# Patient Record
Sex: FEMALE | Race: WHITE | HISPANIC OR LATINO | URBAN - METROPOLITAN AREA
[De-identification: names, ages, dates, MRNs, and addresses within clinical notes are randomized per-mention and may not be internally consistent; named-entity substitution may affect disease eponyms.]

---

## 2017-03-06 ENCOUNTER — OUTPATIENT (OUTPATIENT)
Dept: OUTPATIENT SERVICES | Facility: HOSPITAL | Age: 32
LOS: 1 days | End: 2017-03-06
Payer: COMMERCIAL

## 2017-03-06 ENCOUNTER — RESULT REVIEW (OUTPATIENT)
Age: 32
End: 2017-03-06

## 2017-03-06 PROCEDURE — 76536 US EXAM OF HEAD AND NECK: CPT | Mod: 26

## 2017-03-06 PROCEDURE — 76536 US EXAM OF HEAD AND NECK: CPT

## 2017-03-06 PROCEDURE — 76817 TRANSVAGINAL US OBSTETRIC: CPT

## 2017-03-06 PROCEDURE — 76817 TRANSVAGINAL US OBSTETRIC: CPT | Mod: 26

## 2017-03-07 ENCOUNTER — OUTPATIENT (OUTPATIENT)
Dept: OUTPATIENT SERVICES | Facility: HOSPITAL | Age: 32
LOS: 1 days | End: 2017-03-07
Payer: COMMERCIAL

## 2017-03-07 DIAGNOSIS — Z13.79 ENCOUNTER FOR OTHER SCREENING FOR GENETIC AND CHROMOSOMAL ANOMALIES: ICD-10-CM

## 2017-03-07 PROCEDURE — 88305 TISSUE EXAM BY PATHOLOGIST: CPT

## 2017-03-08 LAB — SURGICAL PATHOLOGY STUDY: SIGNIFICANT CHANGE UP

## 2017-03-15 DIAGNOSIS — E04.1 NONTOXIC SINGLE THYROID NODULE: ICD-10-CM

## 2017-03-15 DIAGNOSIS — O34.11 MATERNAL CARE FOR BENIGN TUMOR OF CORPUS UTERI, FIRST TRIMESTER: ICD-10-CM

## 2017-03-15 DIAGNOSIS — Z3A.01 LESS THAN 8 WEEKS GESTATION OF PREGNANCY: ICD-10-CM

## 2017-03-20 LAB — CHROM ANALY OVERALL INTERP SPEC-IMP: SIGNIFICANT CHANGE UP

## 2017-07-31 ENCOUNTER — OUTPATIENT (OUTPATIENT)
Dept: OUTPATIENT SERVICES | Facility: HOSPITAL | Age: 32
LOS: 1 days | End: 2017-07-31
Payer: COMMERCIAL

## 2017-07-31 DIAGNOSIS — N39.0 URINARY TRACT INFECTION, SITE NOT SPECIFIED: ICD-10-CM

## 2017-07-31 PROCEDURE — 81001 URINALYSIS AUTO W/SCOPE: CPT

## 2017-07-31 PROCEDURE — 87086 URINE CULTURE/COLONY COUNT: CPT

## 2017-08-30 ENCOUNTER — OTHER (OUTPATIENT)
Age: 32
End: 2017-08-30

## 2017-09-06 ENCOUNTER — RX RENEWAL (OUTPATIENT)
Age: 32
End: 2017-09-06

## 2017-09-22 ENCOUNTER — OUTPATIENT (OUTPATIENT)
Dept: OUTPATIENT SERVICES | Facility: HOSPITAL | Age: 32
LOS: 1 days | End: 2017-09-22
Payer: COMMERCIAL

## 2017-09-22 PROCEDURE — 76830 TRANSVAGINAL US NON-OB: CPT

## 2017-09-22 PROCEDURE — A9585: CPT

## 2017-09-22 PROCEDURE — 76856 US EXAM PELVIC COMPLETE: CPT

## 2017-09-22 PROCEDURE — 76700 US EXAM ABDOM COMPLETE: CPT

## 2017-09-22 PROCEDURE — 76830 TRANSVAGINAL US NON-OB: CPT | Mod: 26

## 2017-09-22 PROCEDURE — 76856 US EXAM PELVIC COMPLETE: CPT | Mod: 26

## 2017-09-22 PROCEDURE — 76700 US EXAM ABDOM COMPLETE: CPT | Mod: 26

## 2017-09-22 PROCEDURE — 70553 MRI BRAIN STEM W/O & W/DYE: CPT

## 2017-09-22 PROCEDURE — 70553 MRI BRAIN STEM W/O & W/DYE: CPT | Mod: 26

## 2017-10-02 ENCOUNTER — APPOINTMENT (OUTPATIENT)
Dept: HUMAN REPRODUCTION | Facility: CLINIC | Age: 32
End: 2017-10-02
Payer: COMMERCIAL

## 2017-10-02 PROCEDURE — 36415 COLL VENOUS BLD VENIPUNCTURE: CPT

## 2017-10-02 PROCEDURE — 76830 TRANSVAGINAL US NON-OB: CPT

## 2017-10-02 PROCEDURE — 99214 OFFICE O/P EST MOD 30 MIN: CPT | Mod: 25

## 2017-11-01 ENCOUNTER — APPOINTMENT (OUTPATIENT)
Dept: HUMAN REPRODUCTION | Facility: CLINIC | Age: 32
End: 2017-11-01
Payer: COMMERCIAL

## 2017-11-01 PROCEDURE — 99215 OFFICE O/P EST HI 40 MIN: CPT | Mod: 25

## 2017-11-16 ENCOUNTER — APPOINTMENT (OUTPATIENT)
Dept: HUMAN REPRODUCTION | Facility: CLINIC | Age: 32
End: 2017-11-16
Payer: COMMERCIAL

## 2017-11-16 PROCEDURE — 36415 COLL VENOUS BLD VENIPUNCTURE: CPT

## 2017-11-16 PROCEDURE — 99212 OFFICE O/P EST SF 10 MIN: CPT | Mod: 25

## 2017-12-06 ENCOUNTER — CHART COPY (OUTPATIENT)
Age: 32
End: 2017-12-06

## 2017-12-27 ENCOUNTER — OUTPATIENT (OUTPATIENT)
Dept: OUTPATIENT SERVICES | Facility: HOSPITAL | Age: 32
LOS: 1 days | End: 2017-12-27
Payer: COMMERCIAL

## 2017-12-27 PROCEDURE — 76817 TRANSVAGINAL US OBSTETRIC: CPT

## 2017-12-27 PROCEDURE — 76817 TRANSVAGINAL US OBSTETRIC: CPT | Mod: 26

## 2018-01-08 ENCOUNTER — OUTPATIENT (OUTPATIENT)
Dept: OUTPATIENT SERVICES | Facility: HOSPITAL | Age: 33
LOS: 1 days | End: 2018-01-08
Payer: COMMERCIAL

## 2018-01-08 PROCEDURE — 76801 OB US < 14 WKS SINGLE FETUS: CPT

## 2018-01-08 PROCEDURE — 76801 OB US < 14 WKS SINGLE FETUS: CPT | Mod: 26

## 2018-02-07 ENCOUNTER — CHART COPY (OUTPATIENT)
Age: 33
End: 2018-02-07

## 2018-04-11 ENCOUNTER — OUTPATIENT (OUTPATIENT)
Dept: OUTPATIENT SERVICES | Facility: HOSPITAL | Age: 33
LOS: 1 days | End: 2018-04-11
Payer: COMMERCIAL

## 2018-04-11 DIAGNOSIS — O26.899 OTHER SPECIFIED PREGNANCY RELATED CONDITIONS, UNSPECIFIED TRIMESTER: ICD-10-CM

## 2018-04-11 DIAGNOSIS — Z3A.00 WEEKS OF GESTATION OF PREGNANCY NOT SPECIFIED: ICD-10-CM

## 2018-04-11 LAB
APPEARANCE UR: CLEAR — SIGNIFICANT CHANGE UP
BILIRUB UR-MCNC: NEGATIVE — SIGNIFICANT CHANGE UP
COLOR SPEC: YELLOW — SIGNIFICANT CHANGE UP
DIFF PNL FLD: NEGATIVE — SIGNIFICANT CHANGE UP
GLUCOSE UR QL: NEGATIVE — SIGNIFICANT CHANGE UP
KETONES UR-MCNC: NEGATIVE — SIGNIFICANT CHANGE UP
LEUKOCYTE ESTERASE UR-ACNC: NEGATIVE — SIGNIFICANT CHANGE UP
NITRITE UR-MCNC: NEGATIVE — SIGNIFICANT CHANGE UP
PH UR: 7 — SIGNIFICANT CHANGE UP (ref 5–8)
PROT UR-MCNC: NEGATIVE MG/DL — SIGNIFICANT CHANGE UP
SP GR SPEC: 1.01 — SIGNIFICANT CHANGE UP (ref 1–1.03)
UROBILINOGEN FLD QL: 0.2 E.U./DL — SIGNIFICANT CHANGE UP

## 2018-04-11 PROCEDURE — 81003 URINALYSIS AUTO W/O SCOPE: CPT

## 2018-04-11 PROCEDURE — 87086 URINE CULTURE/COLONY COUNT: CPT

## 2018-04-11 PROCEDURE — 76815 OB US LIMITED FETUS(S): CPT

## 2018-04-11 PROCEDURE — 99214 OFFICE O/P EST MOD 30 MIN: CPT

## 2018-04-12 LAB
CULTURE RESULTS: NO GROWTH — SIGNIFICANT CHANGE UP
SPECIMEN SOURCE: SIGNIFICANT CHANGE UP

## 2018-05-31 ENCOUNTER — RESULT REVIEW (OUTPATIENT)
Age: 33
End: 2018-05-31

## 2018-06-01 LAB
AMPHET UR-MCNC: NEGATIVE
BARBITURATES UR-MCNC: NEGATIVE
BENZODIAZ UR-MCNC: NEGATIVE
COCAINE METAB.OTHER UR-MCNC: NEGATIVE
CREATININE, URINE: 28.7 MG/DL
METHADONE UR-MCNC: NEGATIVE
METHAQUALONE UR-MCNC: NEGATIVE
OPIATES UR-MCNC: NEGATIVE
PCP UR-MCNC: NEGATIVE
PROPOXYPH UR QL: NEGATIVE
THC UR QL: NEGATIVE

## 2018-07-31 ENCOUNTER — OUTPATIENT (OUTPATIENT)
Dept: OUTPATIENT SERVICES | Facility: HOSPITAL | Age: 33
LOS: 1 days | End: 2018-07-31
Payer: COMMERCIAL

## 2018-07-31 DIAGNOSIS — O26.899 OTHER SPECIFIED PREGNANCY RELATED CONDITIONS, UNSPECIFIED TRIMESTER: ICD-10-CM

## 2018-07-31 DIAGNOSIS — Z3A.00 WEEKS OF GESTATION OF PREGNANCY NOT SPECIFIED: ICD-10-CM

## 2018-07-31 LAB
ALBUMIN SERPL ELPH-MCNC: 3.8 G/DL — SIGNIFICANT CHANGE UP (ref 3.3–5)
ALP SERPL-CCNC: 151 U/L — HIGH (ref 40–120)
ALT FLD-CCNC: 10 U/L — SIGNIFICANT CHANGE UP (ref 10–45)
ANION GAP SERPL CALC-SCNC: 14 MMOL/L — SIGNIFICANT CHANGE UP (ref 5–17)
APPEARANCE UR: CLEAR — SIGNIFICANT CHANGE UP
APTT BLD: 25.2 SEC — LOW (ref 27.5–37.4)
AST SERPL-CCNC: 17 U/L — SIGNIFICANT CHANGE UP (ref 10–40)
BASOPHILS NFR BLD AUTO: 0.1 % — SIGNIFICANT CHANGE UP (ref 0–2)
BILIRUB SERPL-MCNC: <0.2 MG/DL — SIGNIFICANT CHANGE UP (ref 0.2–1.2)
BILIRUB UR-MCNC: NEGATIVE — SIGNIFICANT CHANGE UP
BUN SERPL-MCNC: 10 MG/DL — SIGNIFICANT CHANGE UP (ref 7–23)
CALCIUM SERPL-MCNC: 10.3 MG/DL — SIGNIFICANT CHANGE UP (ref 8.4–10.5)
CHLORIDE SERPL-SCNC: 103 MMOL/L — SIGNIFICANT CHANGE UP (ref 96–108)
CO2 SERPL-SCNC: 20 MMOL/L — LOW (ref 22–31)
COLOR SPEC: YELLOW — SIGNIFICANT CHANGE UP
CREAT ?TM UR-MCNC: 84 MG/DL — SIGNIFICANT CHANGE UP
CREAT SERPL-MCNC: 0.69 MG/DL — SIGNIFICANT CHANGE UP (ref 0.5–1.3)
DIFF PNL FLD: NEGATIVE — SIGNIFICANT CHANGE UP
EOSINOPHIL NFR BLD AUTO: 1.2 % — SIGNIFICANT CHANGE UP (ref 0–6)
FIBRINOGEN PPP-MCNC: 573 MG/DL — HIGH (ref 258–438)
GLUCOSE SERPL-MCNC: 88 MG/DL — SIGNIFICANT CHANGE UP (ref 70–99)
GLUCOSE UR QL: NEGATIVE — SIGNIFICANT CHANGE UP
HCT VFR BLD CALC: 33.6 % — LOW (ref 34.5–45)
HGB BLD-MCNC: 11 G/DL — LOW (ref 11.5–15.5)
INR BLD: 0.93 — SIGNIFICANT CHANGE UP (ref 0.88–1.16)
KETONES UR-MCNC: NEGATIVE — SIGNIFICANT CHANGE UP
LDH SERPL L TO P-CCNC: 167 U/L — SIGNIFICANT CHANGE UP (ref 50–242)
LEUKOCYTE ESTERASE UR-ACNC: NEGATIVE — SIGNIFICANT CHANGE UP
LYMPHOCYTES # BLD AUTO: 35.7 % — SIGNIFICANT CHANGE UP (ref 13–44)
MCHC RBC-ENTMCNC: 28.1 PG — SIGNIFICANT CHANGE UP (ref 27–34)
MCHC RBC-ENTMCNC: 32.7 G/DL — SIGNIFICANT CHANGE UP (ref 32–36)
MCV RBC AUTO: 85.9 FL — SIGNIFICANT CHANGE UP (ref 80–100)
MONOCYTES NFR BLD AUTO: 7.6 % — SIGNIFICANT CHANGE UP (ref 2–14)
NEUTROPHILS NFR BLD AUTO: 55.4 % — SIGNIFICANT CHANGE UP (ref 43–77)
NITRITE UR-MCNC: NEGATIVE — SIGNIFICANT CHANGE UP
PH UR: 7 — SIGNIFICANT CHANGE UP (ref 5–8)
PLATELET # BLD AUTO: 160 K/UL — SIGNIFICANT CHANGE UP (ref 150–400)
POTASSIUM SERPL-MCNC: 4.4 MMOL/L — SIGNIFICANT CHANGE UP (ref 3.5–5.3)
POTASSIUM SERPL-SCNC: 4.4 MMOL/L — SIGNIFICANT CHANGE UP (ref 3.5–5.3)
PROT ?TM UR-MCNC: 10 MG/DL — SIGNIFICANT CHANGE UP (ref 0–12)
PROT SERPL-MCNC: 6.8 G/DL — SIGNIFICANT CHANGE UP (ref 6–8.3)
PROT UR-MCNC: NEGATIVE MG/DL — SIGNIFICANT CHANGE UP
PROT/CREAT UR-RTO: 0.1 RATIO — SIGNIFICANT CHANGE UP (ref 0–0.2)
PROTHROM AB SERPL-ACNC: 10.3 SEC — SIGNIFICANT CHANGE UP (ref 9.8–12.7)
RBC # BLD: 3.91 M/UL — SIGNIFICANT CHANGE UP (ref 3.8–5.2)
RBC # FLD: 13.3 % — SIGNIFICANT CHANGE UP (ref 10.3–16.9)
SODIUM SERPL-SCNC: 137 MMOL/L — SIGNIFICANT CHANGE UP (ref 135–145)
SP GR SPEC: 1.01 — SIGNIFICANT CHANGE UP (ref 1–1.03)
URATE SERPL-MCNC: 4.2 MG/DL — SIGNIFICANT CHANGE UP (ref 2.5–7)
UROBILINOGEN FLD QL: 0.2 E.U./DL — SIGNIFICANT CHANGE UP
WBC # BLD: 8.3 K/UL — SIGNIFICANT CHANGE UP (ref 3.8–10.5)
WBC # FLD AUTO: 8.3 K/UL — SIGNIFICANT CHANGE UP (ref 3.8–10.5)

## 2018-07-31 PROCEDURE — 85384 FIBRINOGEN ACTIVITY: CPT

## 2018-07-31 PROCEDURE — 85025 COMPLETE CBC W/AUTO DIFF WBC: CPT

## 2018-07-31 PROCEDURE — 76818 FETAL BIOPHYS PROFILE W/NST: CPT

## 2018-07-31 PROCEDURE — 59025 FETAL NON-STRESS TEST: CPT

## 2018-07-31 PROCEDURE — 36415 COLL VENOUS BLD VENIPUNCTURE: CPT

## 2018-07-31 PROCEDURE — 85610 PROTHROMBIN TIME: CPT

## 2018-07-31 PROCEDURE — 84550 ASSAY OF BLOOD/URIC ACID: CPT

## 2018-07-31 PROCEDURE — 99214 OFFICE O/P EST MOD 30 MIN: CPT

## 2018-07-31 PROCEDURE — 84156 ASSAY OF PROTEIN URINE: CPT

## 2018-07-31 PROCEDURE — 85730 THROMBOPLASTIN TIME PARTIAL: CPT

## 2018-07-31 PROCEDURE — 83615 LACTATE (LD) (LDH) ENZYME: CPT

## 2018-07-31 PROCEDURE — 80053 COMPREHEN METABOLIC PANEL: CPT

## 2018-07-31 PROCEDURE — 81003 URINALYSIS AUTO W/O SCOPE: CPT

## 2018-08-02 DIAGNOSIS — O09.623 SUPERVISION OF YOUNG MULTIGRAVIDA, THIRD TRIMESTER: ICD-10-CM

## 2018-08-10 ENCOUNTER — OUTPATIENT (OUTPATIENT)
Dept: OUTPATIENT SERVICES | Facility: HOSPITAL | Age: 33
LOS: 1 days | End: 2018-08-10
Payer: COMMERCIAL

## 2018-08-10 ENCOUNTER — INPATIENT (INPATIENT)
Facility: HOSPITAL | Age: 33
LOS: 2 days | Discharge: HOME | End: 2018-08-13
Attending: OBSTETRICS & GYNECOLOGY | Admitting: OBSTETRICS & GYNECOLOGY
Payer: COMMERCIAL

## 2018-08-10 VITALS — WEIGHT: 194.01 LBS | HEIGHT: 67 IN

## 2018-08-10 DIAGNOSIS — O26.899 OTHER SPECIFIED PREGNANCY RELATED CONDITIONS, UNSPECIFIED TRIMESTER: ICD-10-CM

## 2018-08-10 DIAGNOSIS — Z3A.00 WEEKS OF GESTATION OF PREGNANCY NOT SPECIFIED: ICD-10-CM

## 2018-08-10 LAB
ALBUMIN SERPL ELPH-MCNC: 3.7 G/DL — SIGNIFICANT CHANGE UP (ref 3.3–5)
ALP SERPL-CCNC: 155 U/L — HIGH (ref 40–120)
ALT FLD-CCNC: 11 U/L — SIGNIFICANT CHANGE UP (ref 10–45)
ANION GAP SERPL CALC-SCNC: 16 MMOL/L — SIGNIFICANT CHANGE UP (ref 5–17)
APPEARANCE UR: CLEAR — SIGNIFICANT CHANGE UP
APTT BLD: 24.8 SEC — LOW (ref 27.5–37.4)
AST SERPL-CCNC: 17 U/L — SIGNIFICANT CHANGE UP (ref 10–40)
BASOPHILS NFR BLD AUTO: 0 % — SIGNIFICANT CHANGE UP (ref 0–2)
BILIRUB SERPL-MCNC: 0.2 MG/DL — SIGNIFICANT CHANGE UP (ref 0.2–1.2)
BILIRUB UR-MCNC: NEGATIVE — SIGNIFICANT CHANGE UP
BLD GP AB SCN SERPL QL: NEGATIVE — SIGNIFICANT CHANGE UP
BUN SERPL-MCNC: 7 MG/DL — SIGNIFICANT CHANGE UP (ref 7–23)
CALCIUM SERPL-MCNC: 9.5 MG/DL — SIGNIFICANT CHANGE UP (ref 8.4–10.5)
CHLORIDE SERPL-SCNC: 102 MMOL/L — SIGNIFICANT CHANGE UP (ref 96–108)
CO2 SERPL-SCNC: 20 MMOL/L — LOW (ref 22–31)
COLOR SPEC: YELLOW — SIGNIFICANT CHANGE UP
CREAT ?TM UR-MCNC: 70 MG/DL — SIGNIFICANT CHANGE UP
CREAT SERPL-MCNC: 0.65 MG/DL — SIGNIFICANT CHANGE UP (ref 0.5–1.3)
DIFF PNL FLD: NEGATIVE — SIGNIFICANT CHANGE UP
EOSINOPHIL NFR BLD AUTO: 0.9 % — SIGNIFICANT CHANGE UP (ref 0–6)
FIBRINOGEN PPP-MCNC: 500 MG/DL — HIGH (ref 258–438)
GLUCOSE SERPL-MCNC: 107 MG/DL — HIGH (ref 70–99)
GLUCOSE UR QL: NEGATIVE — SIGNIFICANT CHANGE UP
HCT VFR BLD CALC: 34.8 % — SIGNIFICANT CHANGE UP (ref 34.5–45)
HGB BLD-MCNC: 11.3 G/DL — LOW (ref 11.5–15.5)
INR BLD: 0.94 — SIGNIFICANT CHANGE UP (ref 0.88–1.16)
KETONES UR-MCNC: NEGATIVE — SIGNIFICANT CHANGE UP
LDH SERPL L TO P-CCNC: 162 U/L — SIGNIFICANT CHANGE UP (ref 50–242)
LEUKOCYTE ESTERASE UR-ACNC: NEGATIVE — SIGNIFICANT CHANGE UP
LYMPHOCYTES # BLD AUTO: 28 % — SIGNIFICANT CHANGE UP (ref 13–44)
MCHC RBC-ENTMCNC: 28.4 PG — SIGNIFICANT CHANGE UP (ref 27–34)
MCHC RBC-ENTMCNC: 32.5 G/DL — SIGNIFICANT CHANGE UP (ref 32–36)
MCV RBC AUTO: 87.4 FL — SIGNIFICANT CHANGE UP (ref 80–100)
MONOCYTES NFR BLD AUTO: 3.9 % — SIGNIFICANT CHANGE UP (ref 2–14)
NEUTROPHILS NFR BLD AUTO: 67.2 % — SIGNIFICANT CHANGE UP (ref 43–77)
NITRITE UR-MCNC: NEGATIVE — SIGNIFICANT CHANGE UP
PH UR: 6.5 — SIGNIFICANT CHANGE UP (ref 5–8)
PLATELET # BLD AUTO: 199 K/UL — SIGNIFICANT CHANGE UP (ref 150–400)
POTASSIUM SERPL-MCNC: 3.9 MMOL/L — SIGNIFICANT CHANGE UP (ref 3.5–5.3)
POTASSIUM SERPL-SCNC: 3.9 MMOL/L — SIGNIFICANT CHANGE UP (ref 3.5–5.3)
PROT ?TM UR-MCNC: 10 MG/DL — SIGNIFICANT CHANGE UP (ref 0–12)
PROT SERPL-MCNC: 6.6 G/DL — SIGNIFICANT CHANGE UP (ref 6–8.3)
PROT UR-MCNC: NEGATIVE MG/DL — SIGNIFICANT CHANGE UP
PROT/CREAT UR-RTO: 0.1 RATIO — SIGNIFICANT CHANGE UP (ref 0–0.2)
PROTHROM AB SERPL-ACNC: 10.4 SEC — SIGNIFICANT CHANGE UP (ref 9.8–12.7)
RBC # BLD: 3.98 M/UL — SIGNIFICANT CHANGE UP (ref 3.8–5.2)
RBC # FLD: 13.6 % — SIGNIFICANT CHANGE UP (ref 10.3–16.9)
RH IG SCN BLD-IMP: POSITIVE — SIGNIFICANT CHANGE UP
SODIUM SERPL-SCNC: 138 MMOL/L — SIGNIFICANT CHANGE UP (ref 135–145)
SP GR SPEC: 1.01 — SIGNIFICANT CHANGE UP (ref 1–1.03)
URATE SERPL-MCNC: 4.9 MG/DL — SIGNIFICANT CHANGE UP (ref 2.5–7)
UROBILINOGEN FLD QL: 0.2 E.U./DL — SIGNIFICANT CHANGE UP
WBC # BLD: 7.6 K/UL — SIGNIFICANT CHANGE UP (ref 3.8–10.5)
WBC # FLD AUTO: 7.6 K/UL — SIGNIFICANT CHANGE UP (ref 3.8–10.5)

## 2018-08-10 PROCEDURE — 99214 OFFICE O/P EST MOD 30 MIN: CPT

## 2018-08-10 PROCEDURE — 85384 FIBRINOGEN ACTIVITY: CPT

## 2018-08-10 PROCEDURE — 85610 PROTHROMBIN TIME: CPT

## 2018-08-10 PROCEDURE — 84550 ASSAY OF BLOOD/URIC ACID: CPT

## 2018-08-10 PROCEDURE — 80053 COMPREHEN METABOLIC PANEL: CPT

## 2018-08-10 PROCEDURE — 86850 RBC ANTIBODY SCREEN: CPT

## 2018-08-10 PROCEDURE — 85730 THROMBOPLASTIN TIME PARTIAL: CPT

## 2018-08-10 PROCEDURE — 81003 URINALYSIS AUTO W/O SCOPE: CPT

## 2018-08-10 PROCEDURE — 85025 COMPLETE CBC W/AUTO DIFF WBC: CPT

## 2018-08-10 PROCEDURE — 76818 FETAL BIOPHYS PROFILE W/NST: CPT

## 2018-08-10 PROCEDURE — 83615 LACTATE (LD) (LDH) ENZYME: CPT

## 2018-08-10 PROCEDURE — 86901 BLOOD TYPING SEROLOGIC RH(D): CPT

## 2018-08-10 PROCEDURE — 86900 BLOOD TYPING SEROLOGIC ABO: CPT

## 2018-08-10 PROCEDURE — 84156 ASSAY OF PROTEIN URINE: CPT

## 2018-08-11 ENCOUNTER — RESULT REVIEW (OUTPATIENT)
Age: 33
End: 2018-08-11

## 2018-08-11 LAB
BASOPHILS NFR BLD AUTO: 0.1 % — SIGNIFICANT CHANGE UP (ref 0–2)
BLD GP AB SCN SERPL QL: NEGATIVE — SIGNIFICANT CHANGE UP
EOSINOPHIL NFR BLD AUTO: 1.2 % — SIGNIFICANT CHANGE UP (ref 0–6)
HCT VFR BLD CALC: 34.7 % — SIGNIFICANT CHANGE UP (ref 34.5–45)
HGB BLD-MCNC: 11.6 G/DL — SIGNIFICANT CHANGE UP (ref 11.5–15.5)
LYMPHOCYTES # BLD AUTO: 33.5 % — SIGNIFICANT CHANGE UP (ref 13–44)
MCHC RBC-ENTMCNC: 28.7 PG — SIGNIFICANT CHANGE UP (ref 27–34)
MCHC RBC-ENTMCNC: 33.4 G/DL — SIGNIFICANT CHANGE UP (ref 32–36)
MCV RBC AUTO: 85.9 FL — SIGNIFICANT CHANGE UP (ref 80–100)
MONOCYTES NFR BLD AUTO: 7.6 % — SIGNIFICANT CHANGE UP (ref 2–14)
NEUTROPHILS NFR BLD AUTO: 57.6 % — SIGNIFICANT CHANGE UP (ref 43–77)
PLATELET # BLD AUTO: 176 K/UL — SIGNIFICANT CHANGE UP (ref 150–400)
RBC # BLD: 4.04 M/UL — SIGNIFICANT CHANGE UP (ref 3.8–5.2)
RBC # FLD: 13.2 % — SIGNIFICANT CHANGE UP (ref 10.3–16.9)
RH IG SCN BLD-IMP: POSITIVE — SIGNIFICANT CHANGE UP
WBC # BLD: 10.6 K/UL — HIGH (ref 3.8–10.5)
WBC # FLD AUTO: 10.6 K/UL — HIGH (ref 3.8–10.5)

## 2018-08-11 RX ORDER — SIMETHICONE 80 MG/1
80 TABLET, CHEWABLE ORAL EVERY 6 HOURS
Qty: 0 | Refills: 0 | Status: DISCONTINUED | OUTPATIENT
Start: 2018-08-11 | End: 2018-08-13

## 2018-08-11 RX ORDER — HYDROCORTISONE 1 %
1 OINTMENT (GRAM) TOPICAL EVERY 4 HOURS
Qty: 0 | Refills: 0 | Status: DISCONTINUED | OUTPATIENT
Start: 2018-08-11 | End: 2018-08-13

## 2018-08-11 RX ORDER — TETANUS TOXOID, REDUCED DIPHTHERIA TOXOID AND ACELLULAR PERTUSSIS VACCINE, ADSORBED 5; 2.5; 8; 8; 2.5 [IU]/.5ML; [IU]/.5ML; UG/.5ML; UG/.5ML; UG/.5ML
0.5 SUSPENSION INTRAMUSCULAR ONCE
Qty: 0 | Refills: 0 | Status: COMPLETED | OUTPATIENT
Start: 2018-08-11 | End: 2019-07-10

## 2018-08-11 RX ORDER — ACETAMINOPHEN 500 MG
650 TABLET ORAL EVERY 6 HOURS
Qty: 0 | Refills: 0 | Status: DISCONTINUED | OUTPATIENT
Start: 2018-08-11 | End: 2018-08-13

## 2018-08-11 RX ORDER — SODIUM CHLORIDE 9 MG/ML
1000 INJECTION, SOLUTION INTRAVENOUS ONCE
Qty: 0 | Refills: 0 | Status: DISCONTINUED | OUTPATIENT
Start: 2018-08-11 | End: 2018-08-11

## 2018-08-11 RX ORDER — DIBUCAINE 1 %
1 OINTMENT (GRAM) RECTAL EVERY 4 HOURS
Qty: 0 | Refills: 0 | Status: DISCONTINUED | OUTPATIENT
Start: 2018-08-11 | End: 2018-08-13

## 2018-08-11 RX ORDER — OXYCODONE AND ACETAMINOPHEN 5; 325 MG/1; MG/1
1 TABLET ORAL
Qty: 0 | Refills: 0 | Status: DISCONTINUED | OUTPATIENT
Start: 2018-08-11 | End: 2018-08-13

## 2018-08-11 RX ORDER — HEPARIN SODIUM 5000 [USP'U]/ML
5000 INJECTION INTRAVENOUS; SUBCUTANEOUS EVERY 12 HOURS
Qty: 0 | Refills: 0 | Status: DISCONTINUED | OUTPATIENT
Start: 2018-08-11 | End: 2018-08-13

## 2018-08-11 RX ORDER — IBUPROFEN 200 MG
600 TABLET ORAL EVERY 6 HOURS
Qty: 0 | Refills: 0 | Status: DISCONTINUED | OUTPATIENT
Start: 2018-08-11 | End: 2018-08-13

## 2018-08-11 RX ORDER — OXYCODONE AND ACETAMINOPHEN 5; 325 MG/1; MG/1
2 TABLET ORAL EVERY 6 HOURS
Qty: 0 | Refills: 0 | Status: DISCONTINUED | OUTPATIENT
Start: 2018-08-11 | End: 2018-08-13

## 2018-08-11 RX ORDER — OXYTOCIN 10 UNIT/ML
41.67 VIAL (ML) INJECTION
Qty: 20 | Refills: 0 | Status: DISCONTINUED | OUTPATIENT
Start: 2018-08-11 | End: 2018-08-13

## 2018-08-11 RX ORDER — SODIUM CHLORIDE 9 MG/ML
3 INJECTION INTRAMUSCULAR; INTRAVENOUS; SUBCUTANEOUS EVERY 8 HOURS
Qty: 0 | Refills: 0 | Status: DISCONTINUED | OUTPATIENT
Start: 2018-08-11 | End: 2018-08-13

## 2018-08-11 RX ORDER — MAGNESIUM HYDROXIDE 400 MG/1
30 TABLET, CHEWABLE ORAL
Qty: 0 | Refills: 0 | Status: DISCONTINUED | OUTPATIENT
Start: 2018-08-11 | End: 2018-08-13

## 2018-08-11 RX ORDER — CITRIC ACID/SODIUM CITRATE 300-500 MG
15 SOLUTION, ORAL ORAL EVERY 4 HOURS
Qty: 0 | Refills: 0 | Status: DISCONTINUED | OUTPATIENT
Start: 2018-08-11 | End: 2018-08-11

## 2018-08-11 RX ORDER — DIPHENHYDRAMINE HCL 50 MG
25 CAPSULE ORAL EVERY 6 HOURS
Qty: 0 | Refills: 0 | Status: DISCONTINUED | OUTPATIENT
Start: 2018-08-11 | End: 2018-08-13

## 2018-08-11 RX ORDER — AER TRAVELER 0.5 G/1
1 SOLUTION RECTAL; TOPICAL EVERY 4 HOURS
Qty: 0 | Refills: 0 | Status: DISCONTINUED | OUTPATIENT
Start: 2018-08-11 | End: 2018-08-13

## 2018-08-11 RX ORDER — GLYCERIN ADULT
1 SUPPOSITORY, RECTAL RECTAL AT BEDTIME
Qty: 0 | Refills: 0 | Status: DISCONTINUED | OUTPATIENT
Start: 2018-08-11 | End: 2018-08-13

## 2018-08-11 RX ORDER — DOCUSATE SODIUM 100 MG
100 CAPSULE ORAL
Qty: 0 | Refills: 0 | Status: DISCONTINUED | OUTPATIENT
Start: 2018-08-11 | End: 2018-08-13

## 2018-08-11 RX ORDER — PRAMOXINE HYDROCHLORIDE 150 MG/15G
1 AEROSOL, FOAM RECTAL EVERY 4 HOURS
Qty: 0 | Refills: 0 | Status: DISCONTINUED | OUTPATIENT
Start: 2018-08-11 | End: 2018-08-13

## 2018-08-11 RX ORDER — OXYTOCIN 10 UNIT/ML
333.33 VIAL (ML) INJECTION
Qty: 20 | Refills: 0 | Status: DISCONTINUED | OUTPATIENT
Start: 2018-08-11 | End: 2018-08-11

## 2018-08-11 RX ORDER — LANOLIN
1 OINTMENT (GRAM) TOPICAL EVERY 6 HOURS
Qty: 0 | Refills: 0 | Status: DISCONTINUED | OUTPATIENT
Start: 2018-08-11 | End: 2018-08-13

## 2018-08-11 RX ORDER — OXYTOCIN 10 UNIT/ML
1 VIAL (ML) INJECTION
Qty: 30 | Refills: 0 | Status: DISCONTINUED | OUTPATIENT
Start: 2018-08-11 | End: 2018-08-12

## 2018-08-11 RX ORDER — SODIUM CHLORIDE 9 MG/ML
1000 INJECTION, SOLUTION INTRAVENOUS
Qty: 0 | Refills: 0 | Status: DISCONTINUED | OUTPATIENT
Start: 2018-08-11 | End: 2018-08-11

## 2018-08-11 RX ADMIN — HEPARIN SODIUM 5000 UNIT(S): 5000 INJECTION INTRAVENOUS; SUBCUTANEOUS at 18:09

## 2018-08-11 RX ADMIN — AER TRAVELER 1 APPLICATION(S): 0.5 SOLUTION RECTAL; TOPICAL at 14:00

## 2018-08-11 RX ADMIN — Medication 600 MILLIGRAM(S): at 19:00

## 2018-08-11 RX ADMIN — Medication 100 MILLIGRAM(S): at 12:26

## 2018-08-11 RX ADMIN — AER TRAVELER 1 APPLICATION(S): 0.5 SOLUTION RECTAL; TOPICAL at 22:15

## 2018-08-11 RX ADMIN — Medication 600 MILLIGRAM(S): at 18:12

## 2018-08-11 RX ADMIN — SODIUM CHLORIDE 3 MILLILITER(S): 9 INJECTION INTRAMUSCULAR; INTRAVENOUS; SUBCUTANEOUS at 14:00

## 2018-08-11 RX ADMIN — AER TRAVELER 1 APPLICATION(S): 0.5 SOLUTION RECTAL; TOPICAL at 18:15

## 2018-08-11 RX ADMIN — Medication 1 TABLET(S): at 12:26

## 2018-08-11 RX ADMIN — SODIUM CHLORIDE 3 MILLILITER(S): 9 INJECTION INTRAMUSCULAR; INTRAVENOUS; SUBCUTANEOUS at 22:15

## 2018-08-11 RX ADMIN — AER TRAVELER 1 APPLICATION(S): 0.5 SOLUTION RECTAL; TOPICAL at 10:10

## 2018-08-11 RX ADMIN — Medication 600 MILLIGRAM(S): at 13:00

## 2018-08-11 RX ADMIN — Medication 600 MILLIGRAM(S): at 12:26

## 2018-08-11 NOTE — LACTATION INITIAL EVALUATION - ORAL/MOTOR ACTIVITY
Possible TOTs as baby was noted to take a suck cycle and pop off the breast needing to be relatched several times. At other times, baby was able to sustain the latch. Some dimpling of cheeks and accessory muscles noted at times when baby sucked.

## 2018-08-11 NOTE — LACTATION INITIAL EVALUATION - NS LACT CON REASON FOR REQ
Met Dyad and FOB. RNLizbeth, helped pt latch baby onto the R breast in a football hold and reports baby was latched deeply. Upon entering room, baby had spontaneously released the R breast with open hand body posture. Discussed bf'ing basics, position/latch, expected input/output. Pt stated "I am so tired. Can we swaddle him and put him in his crib?" Attempted to swaddle baby and he immediately woke up and began giving active feeding cues again. Assisted pt to latch baby onto the L breast in a football hold. Baby fed for several suck cycles, then released and fell asleep again. Baby appears to be cluster feeding; BS RN aware of consult. Encouraged as much SSC and rooming in with baby as possible aiming to FOD of baby when giving active feeding cues. Baby born @0716, 39 wks,  now, . x2 voids/x2 poops since birth (breast only), WNL. No wt loss recorded at this time. Baby is 15 hours old. BS RN aware of consult. Questions answered, parents reassured./primaparous mom

## 2018-08-12 ENCOUNTER — TRANSCRIPTION ENCOUNTER (OUTPATIENT)
Age: 33
End: 2018-08-12

## 2018-08-12 LAB — T PALLIDUM AB TITR SER: NEGATIVE — SIGNIFICANT CHANGE UP

## 2018-08-12 RX ADMIN — AER TRAVELER 1 APPLICATION(S): 0.5 SOLUTION RECTAL; TOPICAL at 02:00

## 2018-08-12 RX ADMIN — AER TRAVELER 1 APPLICATION(S): 0.5 SOLUTION RECTAL; TOPICAL at 17:18

## 2018-08-12 RX ADMIN — HEPARIN SODIUM 5000 UNIT(S): 5000 INJECTION INTRAVENOUS; SUBCUTANEOUS at 18:11

## 2018-08-12 RX ADMIN — SODIUM CHLORIDE 3 MILLILITER(S): 9 INJECTION INTRAMUSCULAR; INTRAVENOUS; SUBCUTANEOUS at 07:29

## 2018-08-12 RX ADMIN — Medication 600 MILLIGRAM(S): at 23:30

## 2018-08-12 RX ADMIN — OXYCODONE AND ACETAMINOPHEN 1 TABLET(S): 5; 325 TABLET ORAL at 22:00

## 2018-08-12 RX ADMIN — Medication 1 TABLET(S): at 12:29

## 2018-08-12 RX ADMIN — Medication 600 MILLIGRAM(S): at 18:10

## 2018-08-12 RX ADMIN — OXYCODONE AND ACETAMINOPHEN 1 TABLET(S): 5; 325 TABLET ORAL at 23:00

## 2018-08-12 RX ADMIN — HEPARIN SODIUM 5000 UNIT(S): 5000 INJECTION INTRAVENOUS; SUBCUTANEOUS at 06:28

## 2018-08-12 RX ADMIN — Medication 600 MILLIGRAM(S): at 12:30

## 2018-08-12 RX ADMIN — Medication 600 MILLIGRAM(S): at 01:00

## 2018-08-12 RX ADMIN — AER TRAVELER 1 APPLICATION(S): 0.5 SOLUTION RECTAL; TOPICAL at 22:36

## 2018-08-12 RX ADMIN — Medication 1 APPLICATION(S): at 23:02

## 2018-08-12 RX ADMIN — AER TRAVELER 1 APPLICATION(S): 0.5 SOLUTION RECTAL; TOPICAL at 06:00

## 2018-08-12 RX ADMIN — Medication 600 MILLIGRAM(S): at 12:57

## 2018-08-12 RX ADMIN — MAGNESIUM HYDROXIDE 30 MILLILITER(S): 400 TABLET, CHEWABLE ORAL at 22:00

## 2018-08-12 RX ADMIN — Medication 600 MILLIGRAM(S): at 06:00

## 2018-08-12 RX ADMIN — Medication 600 MILLIGRAM(S): at 18:29

## 2018-08-12 RX ADMIN — Medication 600 MILLIGRAM(S): at 00:28

## 2018-08-12 RX ADMIN — Medication 600 MILLIGRAM(S): at 07:29

## 2018-08-12 RX ADMIN — Medication 600 MILLIGRAM(S): at 23:55

## 2018-08-12 NOTE — PROGRESS NOTE ADULT - SUBJECTIVE AND OBJECTIVE BOX
Attending Note  Patient voices no complaint, breastfeeding well  VS- stable, afebrile  Breasts- Full, lactating. No erythema or nipple crack  ABd- soft,appropriately distended, fundus 2cm below umbilicus, firm, non-tender  Pelvic- No swelling or ecchymosis,scant lochia rubra  EXT- no edema, meena's negative  IMP: well PP  Plan: d/c home in am           f/u 4wks for pp care           continue prenatal vit

## 2018-08-12 NOTE — PROGRESS NOTE ADULT - SUBJECTIVE AND OBJECTIVE BOX
Patient evaluated at bedside.   She reports pain is well controlled with motrin.    She has been ambulating without assistance, voiding spontaneously, and is breastfeeding.    She denies HA, dizziness, chest pain, palpitations, shortness of breathe, n/v, heavy vaginal bleeding or perineal discomfort.    Physical Exam:  Vital Signs Last 24 Hrs  T(C): 37.1 (11 Aug 2018 18:57), Max: 37.4 (11 Aug 2018 07:45)  T(F): 98.8 (11 Aug 2018 18:57), Max: 99.3 (11 Aug 2018 07:45)  HR: 86 (11 Aug 2018 18:57) (82 - 100)  BP: 130/82 (11 Aug 2018 18:57) (117/77 - 130/82)  BP(mean): --  RR: 17 (11 Aug 2018 18:57) (16 - 18)  SpO2: 99% (11 Aug 2018 18:57) (97% - 100%)    GA: NAD, A+0 x 3  Abd: + BS, soft, nontender, nondistended, no rebound or guarding, uterus firm at midline  : lochia WNL  Extremities: no swelling or calf tenderness                          11.6   10.6  )-----------( 176      ( 11 Aug 2018 00:41 )             34.7     08-10    138  |  102  |  7   ----------------------------<  107<H>  3.9   |  20<L>  |  0.65    Ca    9.5      10 Aug 2018 11:49    TPro  6.6  /  Alb  3.7  /  TBili  0.2  /  DBili  x   /  AST  17  /  ALT  11  /  AlkPhos  155<H>  08-10    PT/INR - ( 10 Aug 2018 11:49 )   PT: 10.4 sec;   INR: 0.94          PTT - ( 10 Aug 2018 11:49 )  PTT:24.8 sec    MEDICATIONS  (STANDING):  diphtheria/tetanus/pertussis (acellular) Vaccine (ADAcel) 0.5 milliLiter(s) IntraMuscular once  fentaNYL (2 MICROgram(s)/mL) + BUpivacaine 0.1% in 0.9% Sodium Chloride PCEA 250 milliLiter(s) Epidural PCA Continuous  heparin  Injectable 5000 Unit(s) SubCutaneous every 12 hours  ibuprofen  Tablet 600 milliGRAM(s) Oral every 6 hours  oxytocin Infusion 41.667 milliUNIT(s)/Min (125 mL/Hr) IV Continuous <Continuous>  oxytocin Infusion 1 milliUNIT(s)/Min (1 mL/Hr) IV Continuous <Continuous>  prenatal multivitamin 1 Tablet(s) Oral daily  sodium chloride 0.9% lock flush 3 milliLiter(s) IV Push every 8 hours  witch hazel Pads 1 Application(s) Topical every 4 hours    MEDICATIONS  (PRN):  acetaminophen   Tablet 650 milliGRAM(s) Oral every 6 hours PRN For Temp greater than 38.5 C (101.3 F)  acetaminophen   Tablet. 650 milliGRAM(s) Oral every 6 hours PRN Mild Pain  dibucaine 1% Ointment 1 Application(s) Topical every 4 hours PRN Mild Perineal Discomfort  diphenhydrAMINE   Capsule 25 milliGRAM(s) Oral every 6 hours PRN Itching  docusate sodium 100 milliGRAM(s) Oral two times a day PRN Stool Softening  glycerin Suppository - Adult 1 Suppository(s) Rectal at bedtime PRN Constipation  hydrocortisone 1% Cream 1 Application(s) Topical every 4 hours PRN Severe Perineal Pain  lanolin Ointment 1 Application(s) Topical every 6 hours PRN Sore Nipples  magnesium hydroxide Suspension 30 milliLiter(s) Oral two times a day PRN Constipation  oxyCODONE    5 mG/acetaminophen 325 mG 1 Tablet(s) Oral every 3 hours PRN Moderate Pain  oxyCODONE    5 mG/acetaminophen 325 mG 2 Tablet(s) Oral every 6 hours PRN Severe Pain  pramoxine 1%/zinc 5% Cream 1 Application(s) Topical every 4 hours PRN Moderate  Perineal Pain  simethicone 80 milliGRAM(s) Chew every 6 hours PRN Gas

## 2018-08-12 NOTE — DISCHARGE NOTE OB - HOSPITAL COURSE
patient was admitted with SROM  and found to have THICK meconium , labor progressed spont however in 2nd stage , she needed pitocin augmentation up to 2mu/min to enhance the contractions. .She delivered a live male from [facility] over RML weighing 6#3oz apgar 7/9.  continues to have transient tachypnea  and resolved by warming baby. A circumcision was performed on 2018  without complication and patient discharge on day 2 in satisfactory condition.

## 2018-08-12 NOTE — DISCHARGE NOTE OB - SECONDARY DIAGNOSIS.
Small for gestational age fetus during pregnancy in third trimester Meconium in amniotic fluid Fibroids, subserous Vaginal delivery

## 2018-08-12 NOTE — DISCHARGE NOTE OB - CARE PLAN
Principal Discharge DX:	Term birth of  male  Goal:	ambulate, continue breastfeeding  Assessment and plan of treatment:	ambulate  Secondary Diagnosis:	Small for gestational age fetus during pregnancy in third trimester  Secondary Diagnosis:	Meconium in amniotic fluid  Secondary Diagnosis:	Fibroids, subserous  Secondary Diagnosis:	Vaginal delivery

## 2018-08-12 NOTE — PROGRESS NOTE ADULT - ASSESSMENT
A/P yo 33y  s/p , PP#1  c/b gHTN, stable    gHTN: PEC labs WNL, normotensive, no toxic complaints, will continue to monitor BP  Pain: OPM  GI: Reg  :  Voiding  DVT prophylaxis: SCDs, ambulation  Dispo: PP#2

## 2018-08-12 NOTE — DISCHARGE NOTE OB - MATERIALS PROVIDED
Breastfeeding Guide and Packet/Guide to Postpartum Care/[facility] Hearing Screen Program/[facility] [place] Screening Program/  Immunization Record/Birth Certificate Instructions/Breastfeeding Log

## 2018-08-12 NOTE — DISCHARGE NOTE OB - PATIENT PORTAL LINK FT
You can access the [facility], offered by [facility], by registering with the following website: [email or web address]

## 2018-08-13 VITALS
RESPIRATION RATE: 16 BRPM | SYSTOLIC BLOOD PRESSURE: 123 MMHG | TEMPERATURE: 98 F | DIASTOLIC BLOOD PRESSURE: 85 MMHG | OXYGEN SATURATION: 100 % | HEART RATE: 92 BPM

## 2018-08-13 PROCEDURE — 86850 RBC ANTIBODY SCREEN: CPT

## 2018-08-13 PROCEDURE — 85025 COMPLETE CBC W/AUTO DIFF WBC: CPT

## 2018-08-13 PROCEDURE — 36415 COLL VENOUS BLD VENIPUNCTURE: CPT

## 2018-08-13 PROCEDURE — 86900 BLOOD TYPING SEROLOGIC ABO: CPT

## 2018-08-13 PROCEDURE — 86901 BLOOD TYPING SEROLOGIC RH(D): CPT

## 2018-08-13 PROCEDURE — 86780 TREPONEMA PALLIDUM: CPT

## 2018-08-13 PROCEDURE — 90715 TDAP VACCINE 7 YRS/> IM: CPT

## 2018-08-13 PROCEDURE — 88307 TISSUE EXAM BY PATHOLOGIST: CPT

## 2018-08-13 RX ORDER — TETANUS TOXOID, REDUCED DIPHTHERIA TOXOID AND ACELLULAR PERTUSSIS VACCINE, ADSORBED 5; 2.5; 8; 8; 2.5 [IU]/.5ML; [IU]/.5ML; UG/.5ML; UG/.5ML; UG/.5ML
0.5 SUSPENSION INTRAMUSCULAR ONCE
Qty: 0 | Refills: 0 | Status: COMPLETED | OUTPATIENT
Start: 2018-08-13 | End: 2018-08-13

## 2018-08-13 RX ADMIN — Medication 600 MILLIGRAM(S): at 06:39

## 2018-08-13 RX ADMIN — AER TRAVELER 1 APPLICATION(S): 0.5 SOLUTION RECTAL; TOPICAL at 14:00

## 2018-08-13 RX ADMIN — Medication 600 MILLIGRAM(S): at 07:00

## 2018-08-13 RX ADMIN — AER TRAVELER 1 APPLICATION(S): 0.5 SOLUTION RECTAL; TOPICAL at 06:40

## 2018-08-13 RX ADMIN — AER TRAVELER 1 APPLICATION(S): 0.5 SOLUTION RECTAL; TOPICAL at 10:00

## 2018-08-13 RX ADMIN — Medication 1 TABLET(S): at 12:30

## 2018-08-13 RX ADMIN — AER TRAVELER 1 APPLICATION(S): 0.5 SOLUTION RECTAL; TOPICAL at 06:00

## 2018-08-13 RX ADMIN — Medication 100 MILLIGRAM(S): at 12:30

## 2018-08-13 RX ADMIN — Medication 600 MILLIGRAM(S): at 12:30

## 2018-08-13 RX ADMIN — HEPARIN SODIUM 5000 UNIT(S): 5000 INJECTION INTRAVENOUS; SUBCUTANEOUS at 06:39

## 2018-08-13 RX ADMIN — TETANUS TOXOID, REDUCED DIPHTHERIA TOXOID AND ACELLULAR PERTUSSIS VACCINE, ADSORBED 0.5 MILLILITER(S): 5; 2.5; 8; 8; 2.5 SUSPENSION INTRAMUSCULAR at 12:32

## 2018-08-13 RX ADMIN — Medication 600 MILLIGRAM(S): at 13:00

## 2018-08-13 NOTE — PROGRESS NOTE ADULT - SUBJECTIVE AND OBJECTIVE BOX
Patient evaluated at bedside.   She reports pain is well controlled with motrin.     She has been ambulating without assistance, voiding spontaneously, and is breastfeeding.    She denies HA, dizziness, chest pain, palpitations, shortness of breathe, n/v, heavy vaginal bleeding or perineal discomfort.    Physical Exam:  Vital Signs Last 24 Hrs  T(C): 36.9 (13 Aug 2018 06:00), Max: 37.2 (12 Aug 2018 11:09)  T(F): 98.5 (13 Aug 2018 06:00), Max: 99 (12 Aug 2018 11:09)  HR: 92 (13 Aug 2018 06:00) (55 - 94)  BP: 117/76 (13 Aug 2018 06:00) (106/71 - 135/93)  BP(mean): --  RR: 14 (13 Aug 2018 06:00) (14 - 18)  SpO2: 100% (13 Aug 2018 06:00) (96% - 100%)    GA: NAD, A+0 x 3  Abd: + BS, soft, nontender, nondistended, no rebound or guarding, uterus firm at midline  : lochia WNL  Extremities: no swelling or calf tenderness    MEDICATIONS  (STANDING):  diphtheria/tetanus/pertussis (acellular) Vaccine (ADAcel) 0.5 milliLiter(s) IntraMuscular once  heparin  Injectable 5000 Unit(s) SubCutaneous every 12 hours  ibuprofen  Tablet 600 milliGRAM(s) Oral every 6 hours  oxytocin Infusion 41.667 milliUNIT(s)/Min (125 mL/Hr) IV Continuous <Continuous>  prenatal multivitamin 1 Tablet(s) Oral daily  sodium chloride 0.9% lock flush 3 milliLiter(s) IV Push every 8 hours  witch hazel Pads 1 Application(s) Topical every 4 hours    MEDICATIONS  (PRN):  acetaminophen   Tablet 650 milliGRAM(s) Oral every 6 hours PRN For Temp greater than 38.5 C (101.3 F)  acetaminophen   Tablet. 650 milliGRAM(s) Oral every 6 hours PRN Mild Pain  dibucaine 1% Ointment 1 Application(s) Topical every 4 hours PRN Mild Perineal Discomfort  diphenhydrAMINE   Capsule 25 milliGRAM(s) Oral every 6 hours PRN Itching  docusate sodium 100 milliGRAM(s) Oral two times a day PRN Stool Softening  glycerin Suppository - Adult 1 Suppository(s) Rectal at bedtime PRN Constipation  hydrocortisone 1% Cream 1 Application(s) Topical every 4 hours PRN Severe Perineal Pain  lanolin Ointment 1 Application(s) Topical every 6 hours PRN Sore Nipples  magnesium hydroxide Suspension 30 milliLiter(s) Oral two times a day PRN Constipation  oxyCODONE    5 mG/acetaminophen 325 mG 1 Tablet(s) Oral every 3 hours PRN Moderate Pain  oxyCODONE    5 mG/acetaminophen 325 mG 2 Tablet(s) Oral every 6 hours PRN Severe Pain  pramoxine 1%/zinc 5% Cream 1 Application(s) Topical every 4 hours PRN Moderate  Perineal Pain  simethicone 80 milliGRAM(s) Chew every 6 hours PRN Gas

## 2018-08-13 NOTE — PROGRESS NOTE ADULT - SUBJECTIVE AND OBJECTIVE BOX
Attending Note  Patient voices no complaint, breastfeeding well  VS- stable, afebrile  Breasts- Full, lactating. No erythema or nipple crack  ABd- soft,appropriately distended, fundus 2cm below umbilicus, firm, non-tender  Pelvic- No swelling or ecchymosis,scant lochia rubra  EXT- no edema, meena's negative  IMP: well PP  Plan: d/c home           f/u 4wks for pp care           continue prenatal vit

## 2018-08-13 NOTE — PROGRESS NOTE ADULT - ASSESSMENT
A/P yo 33y  s/p , PP#2 , c/b gHTN stable    gHTN: PEC labs WNL, normotensive, no toxic complaints, will continue to monitor BP  Pain: OPM  GI: Reg  :  Voiding  DVT prophylaxis: SCDs, ambulation  Dispo: PP#2

## 2018-08-16 DIAGNOSIS — O34.13 MATERNAL CARE FOR BENIGN TUMOR OF CORPUS UTERI, THIRD TRIMESTER: ICD-10-CM

## 2018-08-16 DIAGNOSIS — Z3A.39 39 WEEKS GESTATION OF PREGNANCY: ICD-10-CM

## 2018-08-16 DIAGNOSIS — D25.2 SUBSEROSAL LEIOMYOMA OF UTERUS: ICD-10-CM

## 2018-08-16 DIAGNOSIS — O36.5930 MATERNAL CARE FOR OTHER KNOWN OR SUSPECTED POOR FETAL GROWTH, THIRD TRIMESTER, NOT APPLICABLE OR UNSPECIFIED: ICD-10-CM

## 2018-08-21 LAB — SURGICAL PATHOLOGY STUDY: SIGNIFICANT CHANGE UP

## 2018-08-23 DIAGNOSIS — Z00.00 ENCOUNTER FOR GENERAL ADULT MEDICAL EXAMINATION W/OUT ABNORMAL FINDINGS: ICD-10-CM

## 2018-09-07 LAB
M TB IFN-G BLD-IMP: NEGATIVE
QUANTIFERON TB PLUS MITOGEN MINUS NIL: 9.27 IU/ML
QUANTIFERON TB PLUS NIL: 0.09 IU/ML
QUANTIFERON TB PLUS TB1 MINUS NIL: 0.03 IU/ML
QUANTIFERON TB PLUS TB2 MINUS NIL: 0.02 IU/ML

## 2019-03-18 ENCOUNTER — RESULT REVIEW (OUTPATIENT)
Age: 34
End: 2019-03-18

## 2019-03-20 ENCOUNTER — EMERGENCY (EMERGENCY)
Facility: HOSPITAL | Age: 34
LOS: 1 days | Discharge: HOME | End: 2019-03-20
Attending: EMERGENCY MEDICINE | Admitting: EMERGENCY MEDICINE
Payer: COMMERCIAL

## 2019-03-20 VITALS
OXYGEN SATURATION: 99 % | SYSTOLIC BLOOD PRESSURE: 127 MMHG | HEIGHT: 67 IN | DIASTOLIC BLOOD PRESSURE: 88 MMHG | WEIGHT: 173.72 LBS | RESPIRATION RATE: 18 BRPM | HEART RATE: 73 BPM | TEMPERATURE: 98 F

## 2019-03-20 DIAGNOSIS — R19.7 DIARRHEA, UNSPECIFIED: ICD-10-CM

## 2019-03-20 DIAGNOSIS — R11.2 NAUSEA WITH VOMITING, UNSPECIFIED: ICD-10-CM

## 2019-03-20 DIAGNOSIS — O26.891 OTHER SPECIFIED PREGNANCY RELATED CONDITIONS, FIRST TRIMESTER: ICD-10-CM

## 2019-03-20 DIAGNOSIS — R10.13 EPIGASTRIC PAIN: ICD-10-CM

## 2019-03-20 DIAGNOSIS — Z3A.08 8 WEEKS GESTATION OF PREGNANCY: ICD-10-CM

## 2019-03-20 DIAGNOSIS — Z91.040 LATEX ALLERGY STATUS: ICD-10-CM

## 2019-03-20 DIAGNOSIS — R10.12 LEFT UPPER QUADRANT PAIN: ICD-10-CM

## 2019-03-20 PROCEDURE — 99284 EMERGENCY DEPT VISIT MOD MDM: CPT | Mod: 25

## 2019-03-20 RX ORDER — FAMOTIDINE 10 MG/ML
20 INJECTION INTRAVENOUS ONCE
Qty: 0 | Refills: 0 | Status: COMPLETED | OUTPATIENT
Start: 2019-03-20 | End: 2019-03-20

## 2019-03-20 RX ORDER — ACETAMINOPHEN 500 MG
1000 TABLET ORAL ONCE
Qty: 0 | Refills: 0 | Status: COMPLETED | OUTPATIENT
Start: 2019-03-20 | End: 2019-03-20

## 2019-03-20 RX ORDER — METOCLOPRAMIDE HCL 10 MG
10 TABLET ORAL ONCE
Qty: 0 | Refills: 0 | Status: DISCONTINUED | OUTPATIENT
Start: 2019-03-20 | End: 2019-03-20

## 2019-03-20 RX ORDER — SODIUM CHLORIDE 9 MG/ML
1000 INJECTION INTRAMUSCULAR; INTRAVENOUS; SUBCUTANEOUS ONCE
Qty: 0 | Refills: 0 | Status: COMPLETED | OUTPATIENT
Start: 2019-03-20 | End: 2019-03-20

## 2019-03-20 RX ORDER — METOCLOPRAMIDE HCL 10 MG
10 TABLET ORAL ONCE
Qty: 0 | Refills: 0 | Status: COMPLETED | OUTPATIENT
Start: 2019-03-20 | End: 2019-03-20

## 2019-03-20 RX ADMIN — FAMOTIDINE 20 MILLIGRAM(S): 10 INJECTION INTRAVENOUS at 23:30

## 2019-03-20 RX ADMIN — SODIUM CHLORIDE 1000 MILLILITER(S): 9 INJECTION INTRAMUSCULAR; INTRAVENOUS; SUBCUTANEOUS at 23:29

## 2019-03-20 RX ADMIN — Medication 10 MILLIGRAM(S): at 23:39

## 2019-03-20 RX ADMIN — Medication 30 MILLILITER(S): at 23:30

## 2019-03-20 NOTE — ED PROVIDER NOTE - OBJECTIVE STATEMENT
34F , c/o abd pain. pt states yesterday she ate calamari and shortly after developed upper abd pain.  +nausea, vomiting and diarrhea.  no fevers.  states sister also ate calamari and was sick.  no recent travel. no vaginal bleeding. no dysuria.  states 8wks pregnant.

## 2019-03-20 NOTE — ED PROVIDER NOTE - CARE PLAN
Principal Discharge DX:	Abdominal pain in female patient  Secondary Diagnosis:	8 weeks gestation of pregnancy

## 2019-03-20 NOTE — ED PROVIDER NOTE - NSFOLLOWUPINSTRUCTIONS_ED_ALL_ED_FT
Nausea is the feeling that you have an upset stomach or have to vomit. As nausea gets worse, it can lead to vomiting. Vomiting occurs when stomach contents are thrown up and out of the mouth. Vomiting can make you feel weak and cause you to become dehydrated. Dehydration can make you tired and thirsty, cause you to have a dry mouth, and decrease how often you urinate. Older adults and people with other diseases or a weak immune system are at higher risk for dehydration. It is important to treat your nausea and vomiting as told by your health care provider.    Follow these instructions at home:  Follow instructions from your health care provider about how to care for yourself at home.    Eating and drinking     Follow these recommendations as told by your health care provider:    Take an oral rehydration solution (ORS). This is a drink that is sold at pharmacies and retail stores.  Drink clear fluids in small amounts as you are able. Clear fluids include water, ice chips, diluted fruit juice, and low-calorie sports drinks.  Eat bland, easy-to-digest foods in small amounts as you are able. These foods include bananas, applesauce, rice, lean meats, toast, and crackers.  Avoid fluids that contain a lot of sugar or caffeine, such as energy drinks, sports drinks, and soda.  Avoid alcohol.  Avoid spicy or fatty foods.    General instructions     Drink enough fluid to keep your urine clear or pale yellow.  Wash your hands often. If soap and water are not available, use hand .  Make sure that all people in your household wash their hands well and often.  Take over-the-counter and prescription medicines only as told by your health care provider.  Rest at home while you recover.  Watch your condition for any changes.  Breathe slowly and deeply when you feel nauseated.  Keep all follow-up visits as told by your health care provider. This is important.  Contact a health care provider if:  You have a fever.  You cannot keep fluids down.  Your symptoms get worse.  You have new symptoms.  Your nausea does not go away after two days.  You feel light-headed or dizzy.  You have a headache.  You have muscle cramps.  Get help right away if:  You have pain in your chest, neck, arm, or jaw.  You feel extremely weak or you faint.  You have persistent vomiting.  You see blood in your vomit.  Your vomit looks like black coffee grounds.  You have bloody or black stools or stools that look like tar.  You have a severe headache, a stiff neck, or both.  You have a rash.  You have severe pain, cramping, or bloating in your abdomen.  You have trouble breathing or you are breathing very quickly.  Your heart is beating very quickly.  Your skin feels cold and clammy.  You feel confused.  You have pain when you urinate.  You have signs of dehydration, such as:    Dark urine, very little urine, or no urine.  Cracked lips.  Dry mouth.  Sunken eyes.  Sleepiness.  Weakness.    These symptoms may represent a serious problem that is an emergency. Do not wait to see if the symptoms will go away. Get medical help right away. Call your local emergency services (911 in the [place] Do not drive yourself to the hospital.     This information is not intended to replace advice given to you by your health care provider. Make sure you discuss any questions you have with your health care provider.    Diarrhea, Adult  Diarrhea is frequent loose and watery bowel movements. Diarrhea can make you feel weak and cause you to become dehydrated. Dehydration can make you tired and thirsty, cause you to have a dry mouth, and decrease how often you urinate. Diarrhea typically lasts 2–3 days. However, it can last longer if it is a sign of something more serious. It is important to treat your diarrhea as told by your health care provider.    Follow these recommendations as told by your health care provider:    Take an oral rehydration solution (ORS). This is a drink that is sold at pharmacies and retail stores.  Drink clear fluids, such as water, ice chips, diluted fruit juice, and low-calorie sports drinks.  Eat bland, easy-to-digest foods in small amounts as you are able. These foods include bananas, applesauce, rice, lean meats, toast, and crackers.  Avoid drinking fluids that contain a lot of sugar or caffeine, such as energy drinks, sports drinks, and soda.  Avoid alcohol.  Avoid spicy or fatty foods.    General instructions     Drink enough fluid to keep your urine clear or pale yellow.  Wash your hands often. If soap and water are not available, use hand .  Make sure that all people in your household wash their hands well and often.  Take over-the-counter and prescription medicines only as told by your health care provider.  Rest at home while you recover.  Watch your condition for any changes.  Take a warm bath to relieve any burning or pain from frequent diarrhea episodes.  Keep all follow-up visits as told by your health care provider. This is important.  Contact a health care provider if:  You have a fever.  Your diarrhea gets worse.  You have new symptoms.  You cannot keep fluids down.  You feel light-headed or dizzy.  You have a headache  You have muscle cramps.  Get help right away if:  You have chest pain.  You feel extremely weak or you faint.  You have bloody or black stools or stools that look like tar.  You have severe pain, cramping, or bloating in your abdomen.  You have trouble breathing or you are breathing very quickly.  Your heart is beating very quickly.  Your skin feels cold and clammy.  You feel confused.  You have signs of dehydration, such as:    Dark urine, very little urine, or no urine.  Cracked lips.  Dry mouth.  Sunken eyes.  Sleepiness.  Weakness.    This information is not intended to replace advice given to you by your health care provider. Make sure you discuss any questions you have with your health care provider.

## 2019-03-20 NOTE — ED PROVIDER NOTE - PROGRESS NOTE DETAILS
pt feeling better, no gallstones on US, +IUP with FHR. recommend continued supportive care, f/u with Ob  I have discussed the discharge plan with the patient. The patient agrees with the plan, as discussed.  The patient understands Emergency Department diagnosis is a preliminary diagnosis often based on limited information and that the patient must adhere to the follow-up plan as discussed.  The patient understands that if the symptoms worsen the patient may return to the Emergency Department at any time for further evaluation and treatment.

## 2019-03-20 NOTE — ED ADULT TRIAGE NOTE - CHIEF COMPLAINT QUOTE
Pt states "I have severe abd pain (pointed to epigastric area) with n/v/d since yesterday". Pt also reports being 8 weeks pregnant

## 2019-03-21 VITALS
DIASTOLIC BLOOD PRESSURE: 61 MMHG | OXYGEN SATURATION: 98 % | RESPIRATION RATE: 18 BRPM | SYSTOLIC BLOOD PRESSURE: 100 MMHG | HEART RATE: 91 BPM | TEMPERATURE: 98 F

## 2019-03-21 LAB
APPEARANCE UR: CLEAR — SIGNIFICANT CHANGE UP
BILIRUB UR-MCNC: NEGATIVE — SIGNIFICANT CHANGE UP
COLOR SPEC: YELLOW — SIGNIFICANT CHANGE UP
DIFF PNL FLD: NEGATIVE — SIGNIFICANT CHANGE UP
GLUCOSE UR QL: NEGATIVE — SIGNIFICANT CHANGE UP
KETONES UR-MCNC: NEGATIVE — SIGNIFICANT CHANGE UP
LEUKOCYTE ESTERASE UR-ACNC: NEGATIVE — SIGNIFICANT CHANGE UP
NITRITE UR-MCNC: NEGATIVE — SIGNIFICANT CHANGE UP
PH UR: 6.5 — SIGNIFICANT CHANGE UP (ref 5–8)
PROT UR-MCNC: NEGATIVE MG/DL — SIGNIFICANT CHANGE UP
SP GR SPEC: 1.01 — SIGNIFICANT CHANGE UP (ref 1–1.03)
UROBILINOGEN FLD QL: 0.2 E.U./DL — SIGNIFICANT CHANGE UP

## 2019-03-21 PROCEDURE — 96374 THER/PROPH/DIAG INJ IV PUSH: CPT

## 2019-03-21 PROCEDURE — 96375 TX/PRO/DX INJ NEW DRUG ADDON: CPT

## 2019-03-21 PROCEDURE — 76801 OB US < 14 WKS SINGLE FETUS: CPT

## 2019-03-21 PROCEDURE — 81003 URINALYSIS AUTO W/O SCOPE: CPT

## 2019-03-21 PROCEDURE — 84702 CHORIONIC GONADOTROPIN TEST: CPT

## 2019-03-21 PROCEDURE — 76801 OB US < 14 WKS SINGLE FETUS: CPT | Mod: 26

## 2019-03-21 PROCEDURE — 99284 EMERGENCY DEPT VISIT MOD MDM: CPT | Mod: 25

## 2019-03-21 PROCEDURE — 83690 ASSAY OF LIPASE: CPT

## 2019-03-21 PROCEDURE — 36415 COLL VENOUS BLD VENIPUNCTURE: CPT

## 2019-03-21 PROCEDURE — 76817 TRANSVAGINAL US OBSTETRIC: CPT | Mod: 26

## 2019-03-21 PROCEDURE — 76705 ECHO EXAM OF ABDOMEN: CPT | Mod: 26

## 2019-03-21 PROCEDURE — 80053 COMPREHEN METABOLIC PANEL: CPT

## 2019-03-21 PROCEDURE — 76817 TRANSVAGINAL US OBSTETRIC: CPT

## 2019-03-21 PROCEDURE — 87086 URINE CULTURE/COLONY COUNT: CPT

## 2019-03-21 PROCEDURE — 76705 ECHO EXAM OF ABDOMEN: CPT

## 2019-03-21 PROCEDURE — 85025 COMPLETE CBC W/AUTO DIFF WBC: CPT

## 2019-03-21 RX ADMIN — Medication 400 MILLIGRAM(S): at 00:08

## 2019-03-21 NOTE — ED ADULT NURSE NOTE - OBJECTIVE STATEMENT
PT is 34y female, 8weeks pregnant. States ate calamari yesterday when started to develop N/V/D & abdominal pain. Pt denies any fever/ no chest pain/ no SOB. Denies recent travel, no contact w/ sick.

## 2019-03-21 NOTE — ED ADULT NURSE NOTE - NSIMPLEMENTINTERV_GEN_ALL_ED
Implemented All Universal Safety Interventions:  [place] to call system. Call bell, personal items and telephone within reach. Instruct patient to call for assistance. Room bathroom lighting operational. Non-slip footwear when patient is off stretcher. Physically safe environment: no spills, clutter or unnecessary equipment. Stretcher in lowest position, wheels locked, appropriate side rails in place.

## 2019-03-22 LAB
CULTURE RESULTS: SIGNIFICANT CHANGE UP
SPECIMEN SOURCE: SIGNIFICANT CHANGE UP

## 2019-04-16 ENCOUNTER — APPOINTMENT (OUTPATIENT)
Dept: ANTEPARTUM | Facility: CLINIC | Age: 34
End: 2019-04-16
Payer: COMMERCIAL

## 2019-04-16 PROCEDURE — 36415 COLL VENOUS BLD VENIPUNCTURE: CPT

## 2019-04-16 PROCEDURE — 76813 OB US NUCHAL MEAS 1 GEST: CPT

## 2019-05-17 ENCOUNTER — APPOINTMENT (OUTPATIENT)
Dept: ANTEPARTUM | Facility: CLINIC | Age: 34
End: 2019-05-17
Payer: COMMERCIAL

## 2019-05-17 PROCEDURE — 76817 TRANSVAGINAL US OBSTETRIC: CPT

## 2019-05-17 PROCEDURE — 76805 OB US >/= 14 WKS SNGL FETUS: CPT

## 2019-06-11 ENCOUNTER — APPOINTMENT (OUTPATIENT)
Dept: ANTEPARTUM | Facility: CLINIC | Age: 34
End: 2019-06-11
Payer: COMMERCIAL

## 2019-06-11 ENCOUNTER — TRANSCRIPTION ENCOUNTER (OUTPATIENT)
Age: 34
End: 2019-06-11

## 2019-06-11 ENCOUNTER — INPATIENT (INPATIENT)
Facility: HOSPITAL | Age: 34
LOS: 0 days | Discharge: HOME | DRG: 832 | End: 2019-06-11
Attending: OBSTETRICS & GYNECOLOGY | Admitting: OBSTETRICS & GYNECOLOGY
Payer: COMMERCIAL

## 2019-06-11 VITALS — WEIGHT: 182.98 LBS | HEIGHT: 67 IN

## 2019-06-11 DIAGNOSIS — Z3A.00 WEEKS OF GESTATION OF PREGNANCY NOT SPECIFIED: ICD-10-CM

## 2019-06-11 DIAGNOSIS — O26.899 OTHER SPECIFIED PREGNANCY RELATED CONDITIONS, UNSPECIFIED TRIMESTER: ICD-10-CM

## 2019-06-11 LAB
ALBUMIN SERPL ELPH-MCNC: 3.9 G/DL — SIGNIFICANT CHANGE UP (ref 3.3–5)
ALP SERPL-CCNC: 70 U/L — SIGNIFICANT CHANGE UP (ref 40–120)
ALT FLD-CCNC: 9 U/L — LOW (ref 10–45)
AMNISURE ROM (RUPTURE OF MEMBRANES): POSITIVE
ANION GAP SERPL CALC-SCNC: 13 MMOL/L — SIGNIFICANT CHANGE UP (ref 5–17)
APPEARANCE UR: CLEAR — SIGNIFICANT CHANGE UP
APTT BLD: 30.3 SEC — SIGNIFICANT CHANGE UP (ref 27.5–36.3)
AST SERPL-CCNC: 13 U/L — SIGNIFICANT CHANGE UP (ref 10–40)
BASOPHILS # BLD AUTO: 0.02 K/UL — SIGNIFICANT CHANGE UP (ref 0–0.2)
BASOPHILS NFR BLD AUTO: 0.3 % — SIGNIFICANT CHANGE UP (ref 0–2)
BILIRUB SERPL-MCNC: 0.2 MG/DL — SIGNIFICANT CHANGE UP (ref 0.2–1.2)
BILIRUB UR-MCNC: NEGATIVE — SIGNIFICANT CHANGE UP
BLD GP AB SCN SERPL QL: NEGATIVE — SIGNIFICANT CHANGE UP
BUN SERPL-MCNC: 5 MG/DL — LOW (ref 7–23)
C TRACH RRNA SPEC QL NAA+PROBE: SIGNIFICANT CHANGE UP
CALCIUM SERPL-MCNC: 9.8 MG/DL — SIGNIFICANT CHANGE UP (ref 8.4–10.5)
CHLORIDE SERPL-SCNC: 103 MMOL/L — SIGNIFICANT CHANGE UP (ref 96–108)
CO2 SERPL-SCNC: 21 MMOL/L — LOW (ref 22–31)
COLOR SPEC: YELLOW — SIGNIFICANT CHANGE UP
CREAT SERPL-MCNC: 0.54 MG/DL — SIGNIFICANT CHANGE UP (ref 0.5–1.3)
DIFF PNL FLD: NEGATIVE — SIGNIFICANT CHANGE UP
EOSINOPHIL # BLD AUTO: 0.13 K/UL — SIGNIFICANT CHANGE UP (ref 0–0.5)
EOSINOPHIL NFR BLD AUTO: 1.6 % — SIGNIFICANT CHANGE UP (ref 0–6)
FIBRINOGEN PPP-MCNC: 584 MG/DL — HIGH (ref 258–438)
GLUCOSE SERPL-MCNC: 76 MG/DL — SIGNIFICANT CHANGE UP (ref 70–99)
GLUCOSE UR QL: NEGATIVE — SIGNIFICANT CHANGE UP
HCT VFR BLD CALC: 33.4 % — LOW (ref 34.5–45)
HGB BLD-MCNC: 11.1 G/DL — LOW (ref 11.5–15.5)
IMM GRANULOCYTES NFR BLD AUTO: 0.8 % — SIGNIFICANT CHANGE UP (ref 0–1.5)
INR BLD: 1.03 — SIGNIFICANT CHANGE UP (ref 0.88–1.16)
KETONES UR-MCNC: NEGATIVE — SIGNIFICANT CHANGE UP
LDH SERPL L TO P-CCNC: 166 U/L — SIGNIFICANT CHANGE UP (ref 50–242)
LEUKOCYTE ESTERASE UR-ACNC: NEGATIVE — SIGNIFICANT CHANGE UP
LYMPHOCYTES # BLD AUTO: 1.62 K/UL — SIGNIFICANT CHANGE UP (ref 1–3.3)
LYMPHOCYTES # BLD AUTO: 20.3 % — SIGNIFICANT CHANGE UP (ref 13–44)
MCHC RBC-ENTMCNC: 29.3 PG — SIGNIFICANT CHANGE UP (ref 27–34)
MCHC RBC-ENTMCNC: 33.2 GM/DL — SIGNIFICANT CHANGE UP (ref 32–36)
MCV RBC AUTO: 88.1 FL — SIGNIFICANT CHANGE UP (ref 80–100)
MONOCYTES # BLD AUTO: 0.72 K/UL — SIGNIFICANT CHANGE UP (ref 0–0.9)
MONOCYTES NFR BLD AUTO: 9 % — SIGNIFICANT CHANGE UP (ref 2–14)
N GONORRHOEA RRNA SPEC QL NAA+PROBE: SIGNIFICANT CHANGE UP
NEUTROPHILS # BLD AUTO: 5.43 K/UL — SIGNIFICANT CHANGE UP (ref 1.8–7.4)
NEUTROPHILS NFR BLD AUTO: 68 % — SIGNIFICANT CHANGE UP (ref 43–77)
NITRITE UR-MCNC: NEGATIVE — SIGNIFICANT CHANGE UP
NRBC # BLD: 0 /100 WBCS — SIGNIFICANT CHANGE UP (ref 0–0)
PH UR: 7 — SIGNIFICANT CHANGE UP (ref 5–8)
PLATELET # BLD AUTO: 220 K/UL — SIGNIFICANT CHANGE UP (ref 150–400)
POTASSIUM SERPL-MCNC: 4 MMOL/L — SIGNIFICANT CHANGE UP (ref 3.5–5.3)
POTASSIUM SERPL-SCNC: 4 MMOL/L — SIGNIFICANT CHANGE UP (ref 3.5–5.3)
PROT SERPL-MCNC: 7 G/DL — SIGNIFICANT CHANGE UP (ref 6–8.3)
PROT UR-MCNC: NEGATIVE MG/DL — SIGNIFICANT CHANGE UP
PROTHROM AB SERPL-ACNC: 11.7 SEC — SIGNIFICANT CHANGE UP (ref 10–12.9)
RAPID RVP RESULT: DETECTED
RBC # BLD: 3.79 M/UL — LOW (ref 3.8–5.2)
RBC # FLD: 13.5 % — SIGNIFICANT CHANGE UP (ref 10.3–14.5)
RH IG SCN BLD-IMP: POSITIVE — SIGNIFICANT CHANGE UP
RV+EV RNA SPEC QL NAA+PROBE: DETECTED
SODIUM SERPL-SCNC: 137 MMOL/L — SIGNIFICANT CHANGE UP (ref 135–145)
SP GR SPEC: 1.01 — SIGNIFICANT CHANGE UP (ref 1–1.03)
URATE SERPL-MCNC: 2.5 MG/DL — SIGNIFICANT CHANGE UP (ref 2.5–7)
UROBILINOGEN FLD QL: 0.2 E.U./DL — SIGNIFICANT CHANGE UP
WBC # BLD: 7.98 K/UL — SIGNIFICANT CHANGE UP (ref 3.8–10.5)
WBC # FLD AUTO: 7.98 K/UL — SIGNIFICANT CHANGE UP (ref 3.8–10.5)

## 2019-06-11 PROCEDURE — 86696 HERPES SIMPLEX TYPE 2 TEST: CPT

## 2019-06-11 PROCEDURE — 85025 COMPLETE CBC W/AUTO DIFF WBC: CPT

## 2019-06-11 PROCEDURE — 76817 TRANSVAGINAL US OBSTETRIC: CPT

## 2019-06-11 PROCEDURE — 84550 ASSAY OF BLOOD/URIC ACID: CPT

## 2019-06-11 PROCEDURE — 86778 TOXOPLASMA ANTIBODY IGM: CPT

## 2019-06-11 PROCEDURE — 86762 RUBELLA ANTIBODY: CPT

## 2019-06-11 PROCEDURE — 87486 CHLMYD PNEUM DNA AMP PROBE: CPT

## 2019-06-11 PROCEDURE — 85730 THROMBOPLASTIN TIME PARTIAL: CPT

## 2019-06-11 PROCEDURE — 87086 URINE CULTURE/COLONY COUNT: CPT

## 2019-06-11 PROCEDURE — 84112 EVAL AMNIOTIC FLUID PROTEIN: CPT

## 2019-06-11 PROCEDURE — 87184 SC STD DISK METHOD PER PLATE: CPT

## 2019-06-11 PROCEDURE — 87070 CULTURE OTHR SPECIMN AEROBIC: CPT

## 2019-06-11 PROCEDURE — 86644 CMV ANTIBODY: CPT

## 2019-06-11 PROCEDURE — 86900 BLOOD TYPING SEROLOGIC ABO: CPT

## 2019-06-11 PROCEDURE — 85610 PROTHROMBIN TIME: CPT

## 2019-06-11 PROCEDURE — 80053 COMPREHEN METABOLIC PANEL: CPT

## 2019-06-11 PROCEDURE — 86901 BLOOD TYPING SEROLOGIC RH(D): CPT

## 2019-06-11 PROCEDURE — 86695 HERPES SIMPLEX TYPE 1 TEST: CPT

## 2019-06-11 PROCEDURE — 86777 TOXOPLASMA ANTIBODY: CPT

## 2019-06-11 PROCEDURE — 85384 FIBRINOGEN ACTIVITY: CPT

## 2019-06-11 PROCEDURE — 36415 COLL VENOUS BLD VENIPUNCTURE: CPT

## 2019-06-11 PROCEDURE — 99214 OFFICE O/P EST MOD 30 MIN: CPT

## 2019-06-11 PROCEDURE — 87491 CHLMYD TRACH DNA AMP PROBE: CPT

## 2019-06-11 PROCEDURE — 87581 M.PNEUMON DNA AMP PROBE: CPT

## 2019-06-11 PROCEDURE — 83615 LACTATE (LD) (LDH) ENZYME: CPT

## 2019-06-11 PROCEDURE — 76816 OB US FOLLOW-UP PER FETUS: CPT

## 2019-06-11 PROCEDURE — 86850 RBC ANTIBODY SCREEN: CPT

## 2019-06-11 PROCEDURE — 87633 RESP VIRUS 12-25 TARGETS: CPT

## 2019-06-11 PROCEDURE — 87798 DETECT AGENT NOS DNA AMP: CPT

## 2019-06-11 PROCEDURE — 81003 URINALYSIS AUTO W/O SCOPE: CPT

## 2019-06-11 RX ORDER — CITRIC ACID/SODIUM CITRATE 300-500 MG
15 SOLUTION, ORAL ORAL EVERY 6 HOURS
Refills: 0 | Status: DISCONTINUED | OUTPATIENT
Start: 2019-06-11 | End: 2019-06-11

## 2019-06-11 RX ORDER — SODIUM CHLORIDE 9 MG/ML
1000 INJECTION, SOLUTION INTRAVENOUS
Refills: 0 | Status: DISCONTINUED | OUTPATIENT
Start: 2019-06-11 | End: 2019-06-11

## 2019-06-11 RX ORDER — OXYTOCIN 10 UNIT/ML
333.33 VIAL (ML) INJECTION
Qty: 20 | Refills: 0 | Status: DISCONTINUED | OUTPATIENT
Start: 2019-06-11 | End: 2019-06-11

## 2019-06-11 RX ORDER — SODIUM CHLORIDE 9 MG/ML
1000 INJECTION, SOLUTION INTRAVENOUS ONCE
Refills: 0 | Status: COMPLETED | OUTPATIENT
Start: 2019-06-11 | End: 2019-06-11

## 2019-06-11 RX ORDER — AMPICILLIN TRIHYDRATE 250 MG
2 CAPSULE ORAL EVERY 6 HOURS
Refills: 0 | Status: DISCONTINUED | OUTPATIENT
Start: 2019-06-11 | End: 2019-06-11

## 2019-06-11 RX ADMIN — SODIUM CHLORIDE 125 MILLILITER(S): 9 INJECTION, SOLUTION INTRAVENOUS at 15:44

## 2019-06-11 RX ADMIN — SODIUM CHLORIDE 2000 MILLILITER(S): 9 INJECTION, SOLUTION INTRAVENOUS at 15:43

## 2019-06-11 NOTE — DISCHARGE NOTE ANTEPARTUM - PATIENT PORTAL LINK FT
You can access the [facility], offered by [facility], by registering with the following website: [email or web address]

## 2019-06-11 NOTE — DISCHARGE NOTE ANTEPARTUM - HOSPITAL COURSE
Pt admitted at for suspected previable PPROM with previable dilation to 1 cm. On exam she was 1/50. Multiple discussions with attending and MFM were made. Final decision by patient was to be discharged and follow up for second opinion at [place]. Pt stable and tolerating PO at time of DC. Pt to take temperature three times a day and call for any concern. Pt will also follow up with Dr. Figueroa in two weeks

## 2019-06-11 NOTE — DISCHARGE NOTE ANTEPARTUM - CARE PLAN
Principal Discharge DX:	Cervical incompetence affecting management of pregnancy in second trimester, antepartum  Goal:	Return home  Assessment and plan of treatment:	Stay on bedrest, continue to take temperatures three times a day. Follow up for second opinion at [place] in AM. Follow up with Dr. Figueroa in 2 weeks

## 2019-06-11 NOTE — DISCHARGE NOTE ANTEPARTUM - PLAN OF CARE
Return home Stay on bedrest, continue to take temperatures three times a day. Follow up for second opinion at [place] in AM. Follow up with Dr. Figueroa in 2 weeks

## 2019-06-11 NOTE — DISCHARGE NOTE ANTEPARTUM - CARE PROVIDER_API CALL
Waldo Figueroa)  Obstetrics and Gynecology  [address], [facility], [place] [address]  Phone: [phone]  Fax: [phone]  Follow Up Time:

## 2019-06-12 LAB
CMV IGG FLD QL: 1.2 U/ML — HIGH
CMV IGG SERPL-IMP: POSITIVE
CULTURE RESULTS: SIGNIFICANT CHANGE UP
HSV1 IGG SER-ACNC: 47.6 INDEX — HIGH
HSV1 IGG SERPL QL IA: POSITIVE
HSV2 IGG FLD-ACNC: 16.8 INDEX — HIGH
HSV2 IGG SERPL QL IA: POSITIVE
RUBV IGG SER-ACNC: 7.1 INDEX — SIGNIFICANT CHANGE UP
RUBV IGG SER-IMP: POSITIVE — SIGNIFICANT CHANGE UP
SPECIMEN SOURCE: SIGNIFICANT CHANGE UP
T GONDII IGG SER QL: 45.1 IU/ML — HIGH
T GONDII IGG SER QL: POSITIVE
T GONDII IGM SER QL: 3.3 AU/ML — SIGNIFICANT CHANGE UP
T GONDII IGM SER QL: NEGATIVE — SIGNIFICANT CHANGE UP

## 2019-06-13 LAB — MEV IGM SER-ACNC: <20 AU/ML — SIGNIFICANT CHANGE UP (ref 0–19.9)

## 2019-06-14 LAB
-  CLINDAMYCIN: SIGNIFICANT CHANGE UP
-  ERYTHROMYCIN: SIGNIFICANT CHANGE UP
-  LEVOFLOXACIN: SIGNIFICANT CHANGE UP
-  PENICILLIN: SIGNIFICANT CHANGE UP
-  VANCOMYCIN: SIGNIFICANT CHANGE UP
CULTURE RESULTS: SIGNIFICANT CHANGE UP
METHOD TYPE: SIGNIFICANT CHANGE UP
ORGANISM # SPEC MICROSCOPIC CNT: SIGNIFICANT CHANGE UP
ORGANISM # SPEC MICROSCOPIC CNT: SIGNIFICANT CHANGE UP
SPECIMEN SOURCE: SIGNIFICANT CHANGE UP

## 2019-06-19 DIAGNOSIS — Z91.040 LATEX ALLERGY STATUS: ICD-10-CM

## 2019-06-19 DIAGNOSIS — O42.912 PRETERM PREMATURE RUPTURE OF MEMBRANES, UNSPECIFIED AS TO LENGTH OF TIME BETWEEN RUPTURE AND ONSET OF LABOR, SECOND TRIMESTER: ICD-10-CM

## 2019-06-19 DIAGNOSIS — O26.872 CERVICAL SHORTENING, SECOND TRIMESTER: ICD-10-CM

## 2019-06-19 DIAGNOSIS — Z3A.20 20 WEEKS GESTATION OF PREGNANCY: ICD-10-CM

## 2019-06-25 ENCOUNTER — RESULT REVIEW (OUTPATIENT)
Age: 34
End: 2019-06-25

## 2019-06-25 ENCOUNTER — INPATIENT (INPATIENT)
Facility: HOSPITAL | Age: 34
LOS: 1 days | Discharge: HOME | End: 2019-06-27
Attending: OBSTETRICS & GYNECOLOGY | Admitting: OBSTETRICS & GYNECOLOGY
Payer: COMMERCIAL

## 2019-06-25 VITALS — WEIGHT: 182.98 LBS | HEIGHT: 67 IN

## 2019-06-25 LAB
ALBUMIN SERPL ELPH-MCNC: 3.8 G/DL — SIGNIFICANT CHANGE UP (ref 3.3–5)
ALP SERPL-CCNC: 78 U/L — SIGNIFICANT CHANGE UP (ref 40–120)
ALT FLD-CCNC: <5 U/L — LOW (ref 10–45)
ANION GAP SERPL CALC-SCNC: 12 MMOL/L — SIGNIFICANT CHANGE UP (ref 5–17)
APTT BLD: 30.6 SEC — SIGNIFICANT CHANGE UP (ref 27.5–36.3)
AST SERPL-CCNC: 11 U/L — SIGNIFICANT CHANGE UP (ref 10–40)
BASOPHILS # BLD AUTO: 0.01 K/UL — SIGNIFICANT CHANGE UP (ref 0–0.2)
BASOPHILS NFR BLD AUTO: 0.1 % — SIGNIFICANT CHANGE UP (ref 0–2)
BILIRUB SERPL-MCNC: 0.2 MG/DL — SIGNIFICANT CHANGE UP (ref 0.2–1.2)
BLD GP AB SCN SERPL QL: NEGATIVE — SIGNIFICANT CHANGE UP
BUN SERPL-MCNC: 6 MG/DL — LOW (ref 7–23)
CALCIUM SERPL-MCNC: 9.5 MG/DL — SIGNIFICANT CHANGE UP (ref 8.4–10.5)
CHLORIDE SERPL-SCNC: 103 MMOL/L — SIGNIFICANT CHANGE UP (ref 96–108)
CO2 SERPL-SCNC: 22 MMOL/L — SIGNIFICANT CHANGE UP (ref 22–31)
CREAT SERPL-MCNC: 0.62 MG/DL — SIGNIFICANT CHANGE UP (ref 0.5–1.3)
EOSINOPHIL # BLD AUTO: 0.1 K/UL — SIGNIFICANT CHANGE UP (ref 0–0.5)
EOSINOPHIL NFR BLD AUTO: 0.7 % — SIGNIFICANT CHANGE UP (ref 0–6)
FIBRINOGEN PPP-MCNC: 619 MG/DL — HIGH (ref 258–438)
GLUCOSE SERPL-MCNC: 84 MG/DL — SIGNIFICANT CHANGE UP (ref 70–99)
HCT VFR BLD CALC: 33.9 % — LOW (ref 34.5–45)
HGB BLD-MCNC: 10.9 G/DL — LOW (ref 11.5–15.5)
IMM GRANULOCYTES NFR BLD AUTO: 1.1 % — SIGNIFICANT CHANGE UP (ref 0–1.5)
INR BLD: 1.03 — SIGNIFICANT CHANGE UP (ref 0.88–1.16)
LDH SERPL L TO P-CCNC: 138 U/L — SIGNIFICANT CHANGE UP (ref 50–242)
LYMPHOCYTES # BLD AUTO: 17 % — SIGNIFICANT CHANGE UP (ref 13–44)
LYMPHOCYTES # BLD AUTO: 2.42 K/UL — SIGNIFICANT CHANGE UP (ref 1–3.3)
MCHC RBC-ENTMCNC: 28.5 PG — SIGNIFICANT CHANGE UP (ref 27–34)
MCHC RBC-ENTMCNC: 32.2 GM/DL — SIGNIFICANT CHANGE UP (ref 32–36)
MCV RBC AUTO: 88.5 FL — SIGNIFICANT CHANGE UP (ref 80–100)
MONOCYTES # BLD AUTO: 1 K/UL — HIGH (ref 0–0.9)
MONOCYTES NFR BLD AUTO: 7 % — SIGNIFICANT CHANGE UP (ref 2–14)
NEUTROPHILS # BLD AUTO: 10.59 K/UL — HIGH (ref 1.8–7.4)
NEUTROPHILS NFR BLD AUTO: 74.1 % — SIGNIFICANT CHANGE UP (ref 43–77)
NRBC # BLD: 0 /100 WBCS — SIGNIFICANT CHANGE UP (ref 0–0)
PLATELET # BLD AUTO: 254 K/UL — SIGNIFICANT CHANGE UP (ref 150–400)
POTASSIUM SERPL-MCNC: 4 MMOL/L — SIGNIFICANT CHANGE UP (ref 3.5–5.3)
POTASSIUM SERPL-SCNC: 4 MMOL/L — SIGNIFICANT CHANGE UP (ref 3.5–5.3)
PROT SERPL-MCNC: 7.6 G/DL — SIGNIFICANT CHANGE UP (ref 6–8.3)
PROTHROM AB SERPL-ACNC: 11.6 SEC — SIGNIFICANT CHANGE UP (ref 10–12.9)
RBC # BLD: 3.83 M/UL — SIGNIFICANT CHANGE UP (ref 3.8–5.2)
RBC # FLD: 13.3 % — SIGNIFICANT CHANGE UP (ref 10.3–14.5)
RH IG SCN BLD-IMP: POSITIVE — SIGNIFICANT CHANGE UP
SODIUM SERPL-SCNC: 137 MMOL/L — SIGNIFICANT CHANGE UP (ref 135–145)
URATE SERPL-MCNC: 2.7 MG/DL — SIGNIFICANT CHANGE UP (ref 2.5–7)
WBC # BLD: 14.27 K/UL — HIGH (ref 3.8–10.5)
WBC # FLD AUTO: 14.27 K/UL — HIGH (ref 3.8–10.5)

## 2019-06-25 RX ORDER — ACETAMINOPHEN 500 MG
975 TABLET ORAL
Refills: 0 | Status: DISCONTINUED | OUTPATIENT
Start: 2019-06-25 | End: 2019-06-27

## 2019-06-25 RX ORDER — DIPHENHYDRAMINE HCL 50 MG
25 CAPSULE ORAL EVERY 6 HOURS
Refills: 0 | Status: DISCONTINUED | OUTPATIENT
Start: 2019-06-25 | End: 2019-06-27

## 2019-06-25 RX ORDER — OXYTOCIN 10 UNIT/ML
333.33 VIAL (ML) INJECTION
Qty: 20 | Refills: 0 | Status: DISCONTINUED | OUTPATIENT
Start: 2019-06-25 | End: 2019-06-27

## 2019-06-25 RX ORDER — SODIUM CHLORIDE 9 MG/ML
1000 INJECTION, SOLUTION INTRAVENOUS
Refills: 0 | Status: DISCONTINUED | OUTPATIENT
Start: 2019-06-25 | End: 2019-06-25

## 2019-06-25 RX ORDER — OXYCODONE HYDROCHLORIDE 5 MG/1
5 TABLET ORAL ONCE
Refills: 0 | Status: DISCONTINUED | OUTPATIENT
Start: 2019-06-25 | End: 2019-06-27

## 2019-06-25 RX ORDER — OXYTOCIN 10 UNIT/ML
6 VIAL (ML) INJECTION
Qty: 30 | Refills: 0 | Status: DISCONTINUED | OUTPATIENT
Start: 2019-06-25 | End: 2019-06-27

## 2019-06-25 RX ORDER — OXYTOCIN 10 UNIT/ML
333.33 VIAL (ML) INJECTION
Qty: 20 | Refills: 0 | Status: DISCONTINUED | OUTPATIENT
Start: 2019-06-25 | End: 2019-06-25

## 2019-06-25 RX ORDER — TETANUS TOXOID, REDUCED DIPHTHERIA TOXOID AND ACELLULAR PERTUSSIS VACCINE, ADSORBED 5; 2.5; 8; 8; 2.5 [IU]/.5ML; [IU]/.5ML; UG/.5ML; UG/.5ML; UG/.5ML
0.5 SUSPENSION INTRAMUSCULAR ONCE
Refills: 0 | Status: DISCONTINUED | OUTPATIENT
Start: 2019-06-25 | End: 2019-06-27

## 2019-06-25 RX ORDER — BENZOCAINE 10 %
1 GEL (GRAM) MUCOUS MEMBRANE EVERY 6 HOURS
Refills: 0 | Status: DISCONTINUED | OUTPATIENT
Start: 2019-06-25 | End: 2019-06-27

## 2019-06-25 RX ORDER — HEPARIN SODIUM 5000 [USP'U]/ML
5000 INJECTION INTRAVENOUS; SUBCUTANEOUS EVERY 12 HOURS
Refills: 0 | Status: DISCONTINUED | OUTPATIENT
Start: 2019-06-26 | End: 2019-06-27

## 2019-06-25 RX ORDER — SODIUM CHLORIDE 9 MG/ML
3 INJECTION INTRAMUSCULAR; INTRAVENOUS; SUBCUTANEOUS EVERY 8 HOURS
Refills: 0 | Status: DISCONTINUED | OUTPATIENT
Start: 2019-06-25 | End: 2019-06-27

## 2019-06-25 RX ORDER — GENTAMICIN SULFATE 40 MG/ML
330 VIAL (ML) INJECTION EVERY 24 HOURS
Refills: 0 | Status: DISCONTINUED | OUTPATIENT
Start: 2019-06-25 | End: 2019-06-26

## 2019-06-25 RX ORDER — SIMETHICONE 80 MG/1
80 TABLET, CHEWABLE ORAL EVERY 4 HOURS
Refills: 0 | Status: DISCONTINUED | OUTPATIENT
Start: 2019-06-25 | End: 2019-06-27

## 2019-06-25 RX ORDER — IBUPROFEN 200 MG
600 TABLET ORAL EVERY 6 HOURS
Refills: 0 | Status: COMPLETED | OUTPATIENT
Start: 2019-06-25 | End: 2020-05-23

## 2019-06-25 RX ORDER — CITRIC ACID/SODIUM CITRATE 300-500 MG
15 SOLUTION, ORAL ORAL EVERY 6 HOURS
Refills: 0 | Status: DISCONTINUED | OUTPATIENT
Start: 2019-06-25 | End: 2019-06-25

## 2019-06-25 RX ORDER — OXYCODONE HYDROCHLORIDE 5 MG/1
5 TABLET ORAL
Refills: 0 | Status: DISCONTINUED | OUTPATIENT
Start: 2019-06-25 | End: 2019-06-27

## 2019-06-25 RX ORDER — AMPICILLIN TRIHYDRATE 250 MG
2 CAPSULE ORAL EVERY 6 HOURS
Refills: 0 | Status: DISCONTINUED | OUTPATIENT
Start: 2019-06-26 | End: 2019-06-26

## 2019-06-25 RX ORDER — KETOROLAC TROMETHAMINE 30 MG/ML
30 SYRINGE (ML) INJECTION ONCE
Refills: 0 | Status: DISCONTINUED | OUTPATIENT
Start: 2019-06-25 | End: 2019-06-26

## 2019-06-25 RX ORDER — DIBUCAINE 1 %
1 OINTMENT (GRAM) RECTAL EVERY 6 HOURS
Refills: 0 | Status: DISCONTINUED | OUTPATIENT
Start: 2019-06-25 | End: 2019-06-27

## 2019-06-25 RX ORDER — AMPICILLIN TRIHYDRATE 250 MG
2 CAPSULE ORAL ONCE
Refills: 0 | Status: COMPLETED | OUTPATIENT
Start: 2019-06-25 | End: 2019-06-25

## 2019-06-25 RX ORDER — AER TRAVELER 0.5 G/1
1 SOLUTION RECTAL; TOPICAL EVERY 4 HOURS
Refills: 0 | Status: DISCONTINUED | OUTPATIENT
Start: 2019-06-25 | End: 2019-06-27

## 2019-06-25 RX ORDER — MAGNESIUM HYDROXIDE 400 MG/1
30 TABLET, CHEWABLE ORAL
Refills: 0 | Status: DISCONTINUED | OUTPATIENT
Start: 2019-06-25 | End: 2019-06-27

## 2019-06-25 RX ORDER — AMPICILLIN TRIHYDRATE 250 MG
CAPSULE ORAL
Refills: 0 | Status: DISCONTINUED | OUTPATIENT
Start: 2019-06-25 | End: 2019-06-26

## 2019-06-25 RX ORDER — DOCUSATE SODIUM 100 MG
100 CAPSULE ORAL
Refills: 0 | Status: DISCONTINUED | OUTPATIENT
Start: 2019-06-25 | End: 2019-06-27

## 2019-06-25 RX ORDER — PRAMOXINE HYDROCHLORIDE 150 MG/15G
1 AEROSOL, FOAM RECTAL EVERY 4 HOURS
Refills: 0 | Status: DISCONTINUED | OUTPATIENT
Start: 2019-06-25 | End: 2019-06-27

## 2019-06-25 RX ORDER — GLYCERIN ADULT
1 SUPPOSITORY, RECTAL RECTAL AT BEDTIME
Refills: 0 | Status: DISCONTINUED | OUTPATIENT
Start: 2019-06-25 | End: 2019-06-27

## 2019-06-25 RX ORDER — HYDROCORTISONE 1 %
1 OINTMENT (GRAM) TOPICAL EVERY 6 HOURS
Refills: 0 | Status: DISCONTINUED | OUTPATIENT
Start: 2019-06-25 | End: 2019-06-27

## 2019-06-25 RX ADMIN — Medication 216 GRAM(S): at 21:00

## 2019-06-25 RX ADMIN — Medication 100 MILLIGRAM(S): at 21:10

## 2019-06-25 RX ADMIN — SODIUM CHLORIDE 125 MILLILITER(S): 9 INJECTION, SOLUTION INTRAVENOUS at 18:51

## 2019-06-26 ENCOUNTER — TRANSCRIPTION ENCOUNTER (OUTPATIENT)
Age: 34
End: 2019-06-26

## 2019-06-26 ENCOUNTER — RESULT REVIEW (OUTPATIENT)
Age: 34
End: 2019-06-26

## 2019-06-26 LAB
ALBUMIN SERPL ELPH-MCNC: 3.4 G/DL — SIGNIFICANT CHANGE UP (ref 3.3–5)
ALP SERPL-CCNC: 66 U/L — SIGNIFICANT CHANGE UP (ref 40–120)
ALT FLD-CCNC: 8 U/L — LOW (ref 10–45)
ANION GAP SERPL CALC-SCNC: 15 MMOL/L — SIGNIFICANT CHANGE UP (ref 5–17)
AST SERPL-CCNC: 20 U/L — SIGNIFICANT CHANGE UP (ref 10–40)
BASOPHILS # BLD AUTO: 0.03 K/UL — SIGNIFICANT CHANGE UP (ref 0–0.2)
BASOPHILS NFR BLD AUTO: 0.1 % — SIGNIFICANT CHANGE UP (ref 0–2)
BILIRUB SERPL-MCNC: 0.3 MG/DL — SIGNIFICANT CHANGE UP (ref 0.2–1.2)
BUN SERPL-MCNC: 5 MG/DL — LOW (ref 7–23)
CALCIUM SERPL-MCNC: 9.4 MG/DL — SIGNIFICANT CHANGE UP (ref 8.4–10.5)
CHLORIDE SERPL-SCNC: 104 MMOL/L — SIGNIFICANT CHANGE UP (ref 96–108)
CO2 SERPL-SCNC: 18 MMOL/L — LOW (ref 22–31)
CREAT SERPL-MCNC: 0.54 MG/DL — SIGNIFICANT CHANGE UP (ref 0.5–1.3)
EOSINOPHIL # BLD AUTO: 0 K/UL — SIGNIFICANT CHANGE UP (ref 0–0.5)
EOSINOPHIL NFR BLD AUTO: 0 % — SIGNIFICANT CHANGE UP (ref 0–6)
GLUCOSE SERPL-MCNC: 211 MG/DL — HIGH (ref 70–99)
HCT VFR BLD CALC: 30.4 % — LOW (ref 34.5–45)
HGB BLD-MCNC: 9.8 G/DL — LOW (ref 11.5–15.5)
IMM GRANULOCYTES NFR BLD AUTO: 1.1 % — SIGNIFICANT CHANGE UP (ref 0–1.5)
LDH SERPL L TO P-CCNC: 243 U/L — HIGH (ref 50–242)
LYMPHOCYTES # BLD AUTO: 1.81 K/UL — SIGNIFICANT CHANGE UP (ref 1–3.3)
LYMPHOCYTES # BLD AUTO: 7.4 % — LOW (ref 13–44)
MCHC RBC-ENTMCNC: 28.6 PG — SIGNIFICANT CHANGE UP (ref 27–34)
MCHC RBC-ENTMCNC: 32.2 GM/DL — SIGNIFICANT CHANGE UP (ref 32–36)
MCV RBC AUTO: 88.6 FL — SIGNIFICANT CHANGE UP (ref 80–100)
MONOCYTES # BLD AUTO: 0.31 K/UL — SIGNIFICANT CHANGE UP (ref 0–0.9)
MONOCYTES NFR BLD AUTO: 1.3 % — LOW (ref 2–14)
NEUTROPHILS # BLD AUTO: 22.06 K/UL — HIGH (ref 1.8–7.4)
NEUTROPHILS NFR BLD AUTO: 90.1 % — HIGH (ref 43–77)
NRBC # BLD: 0 /100 WBCS — SIGNIFICANT CHANGE UP (ref 0–0)
PLATELET # BLD AUTO: 236 K/UL — SIGNIFICANT CHANGE UP (ref 150–400)
POTASSIUM SERPL-MCNC: 4 MMOL/L — SIGNIFICANT CHANGE UP (ref 3.5–5.3)
POTASSIUM SERPL-SCNC: 4 MMOL/L — SIGNIFICANT CHANGE UP (ref 3.5–5.3)
PROT SERPL-MCNC: 6.7 G/DL — SIGNIFICANT CHANGE UP (ref 6–8.3)
RBC # BLD: 3.43 M/UL — LOW (ref 3.8–5.2)
RBC # FLD: 13.3 % — SIGNIFICANT CHANGE UP (ref 10.3–14.5)
SODIUM SERPL-SCNC: 137 MMOL/L — SIGNIFICANT CHANGE UP (ref 135–145)
T PALLIDUM AB TITR SER: NEGATIVE — SIGNIFICANT CHANGE UP
URATE SERPL-MCNC: 2.6 MG/DL — SIGNIFICANT CHANGE UP (ref 2.5–7)
WBC # BLD: 24.48 K/UL — HIGH (ref 3.8–10.5)
WBC # FLD AUTO: 24.48 K/UL — HIGH (ref 3.8–10.5)

## 2019-06-26 RX ORDER — IBUPROFEN 200 MG
600 TABLET ORAL EVERY 6 HOURS
Refills: 0 | Status: DISCONTINUED | OUTPATIENT
Start: 2019-06-26 | End: 2019-06-27

## 2019-06-26 RX ORDER — ZOLPIDEM TARTRATE 10 MG/1
5 TABLET ORAL AT BEDTIME
Refills: 0 | Status: DISCONTINUED | OUTPATIENT
Start: 2019-06-26 | End: 2019-06-27

## 2019-06-26 RX ADMIN — Medication 975 MILLIGRAM(S): at 05:00

## 2019-06-26 RX ADMIN — Medication 600 MILLIGRAM(S): at 19:01

## 2019-06-26 RX ADMIN — Medication 975 MILLIGRAM(S): at 08:40

## 2019-06-26 RX ADMIN — Medication 0.2 MILLIGRAM(S): at 08:41

## 2019-06-26 RX ADMIN — SODIUM CHLORIDE 3 MILLILITER(S): 9 INJECTION INTRAMUSCULAR; INTRAVENOUS; SUBCUTANEOUS at 22:20

## 2019-06-26 RX ADMIN — Medication 30 MILLIGRAM(S): at 13:45

## 2019-06-26 RX ADMIN — Medication 100 MILLIGRAM(S): at 12:59

## 2019-06-26 RX ADMIN — SODIUM CHLORIDE 3 MILLILITER(S): 9 INJECTION INTRAMUSCULAR; INTRAVENOUS; SUBCUTANEOUS at 14:31

## 2019-06-26 RX ADMIN — ZOLPIDEM TARTRATE 5 MILLIGRAM(S): 10 TABLET ORAL at 22:13

## 2019-06-26 RX ADMIN — Medication 216 GRAM(S): at 04:00

## 2019-06-26 RX ADMIN — Medication 0.2 MILLIGRAM(S): at 03:42

## 2019-06-26 RX ADMIN — Medication 100 MILLIGRAM(S): at 19:01

## 2019-06-26 RX ADMIN — Medication 216 GRAM(S): at 09:15

## 2019-06-26 RX ADMIN — Medication 600 MILLIGRAM(S): at 20:05

## 2019-06-26 RX ADMIN — Medication 30 MILLIGRAM(S): at 12:59

## 2019-06-26 RX ADMIN — Medication 975 MILLIGRAM(S): at 09:45

## 2019-06-26 RX ADMIN — HEPARIN SODIUM 5000 UNIT(S): 5000 INJECTION INTRAVENOUS; SUBCUTANEOUS at 09:45

## 2019-06-26 RX ADMIN — HEPARIN SODIUM 5000 UNIT(S): 5000 INJECTION INTRAVENOUS; SUBCUTANEOUS at 21:33

## 2019-06-26 RX ADMIN — Medication 216 GRAM(S): at 14:41

## 2019-06-26 NOTE — PROGRESS NOTE ADULT - ASSESSMENT
A/P yo 34y  s/p previable PTD, PP#1 , c/b chorio and retained POC s/p suction D+C, patient received methergine PO    Chorio: patient afebrile overnight, on IV A/G for 24 hr  1. Pain: OPM  2. GI: Reg  3. :  Voiding  4. DVT prophylaxis: SCDs, ambulation  5. Dispo: Pending A/P yo 34y  s/p previable PTD, PP#1 , c/b chorio and retained POC s/p suction D+C, patient received methergine PO    Chorio: patient afebrile overnight, on IV A/G for 24 hr  1. Pain: OPM  2. GI: Reg  3. :  TOV  4. DVT prophylaxis: SCDs, ambulation  5. Dispo: Pending

## 2019-06-26 NOTE — PROGRESS NOTE ADULT - SUBJECTIVE AND OBJECTIVE BOX
Patient evaluated at bedside.   She reports pain is well controlled.    She has been ambulating without assistance, whitlock in place    She denies HA, fever, chills, dizziness, chest pain, palpitations, shortness of breathe, n/v, heavy vaginal bleeding or perineal discomfort.    Physical Exam:  Vital Signs Last 24 Hrs  T(C): 36.8 (26 Jun 2019 02:00), Max: 37.8 (26 Jun 2019 00:30)  T(F): 98.2 (26 Jun 2019 02:00), Max: 100 (26 Jun 2019 00:30)  HR: 86 (26 Jun 2019 02:00) (82 - 103)  BP: 102/60 (26 Jun 2019 02:00) (99/60 - 112/72)  BP(mean): --  RR: 18 (26 Jun 2019 00:45) (18 - 18)  SpO2: --    GA: NAD, A+0 x 3  Abd: + BS, soft, nontender, nondistended, no rebound or guarding, uterus firm at midline at level of umbilicus   : lochia WNL  Extremities: no swelling or calf tenderness                          10.9   14.27 )-----------( 254      ( 25 Jun 2019 18:45 )             33.9     06-25    137  |  103  |  6<L>  ----------------------------<  84  4.0   |  22  |  0.62    Ca    9.5      25 Jun 2019 18:45    TPro  7.6  /  Alb  3.8  /  TBili  0.2  /  DBili  x   /  AST  11  /  ALT  <5<L>  /  AlkPhos  78  06-25    PT/INR - ( 25 Jun 2019 18:45 )   PT: 11.6 sec;   INR: 1.03          PTT - ( 25 Jun 2019 18:45 )  PTT:30.6 sec    MEDICATIONS  (STANDING):  acetaminophen   Tablet .. 975 milliGRAM(s) Oral <User Schedule>  ampicillin  IVPB      ampicillin  IVPB 2 Gram(s) IV Intermittent every 6 hours  diphtheria/tetanus/pertussis (acellular) Vaccine (ADAcel) 0.5 milliLiter(s) IntraMuscular once  heparin  Injectable 5000 Unit(s) SubCutaneous every 12 hours  ibuprofen  Tablet. 600 milliGRAM(s) Oral every 6 hours  ketorolac   Injectable 30 milliGRAM(s) IV Push once  methylergonovine 0.2 milliGRAM(s) Oral every 4 hours  oxytocin Infusion 333.333 milliUNIT(s)/Min (1000 mL/Hr) IV Continuous <Continuous>  oxytocin Infusion 6 milliUNIT(s)/Min (6 mL/Hr) IV Continuous <Continuous>  sodium chloride 0.9% lock flush 3 milliLiter(s) IV Push every 8 hours    MEDICATIONS  (PRN):  benzocaine 20%/menthol 0.5% Spray 1 Spray(s) Topical every 6 hours PRN for Perineal discomfort  dibucaine 1% Ointment 1 Application(s) Topical every 6 hours PRN Perineal discomfort  diphenhydrAMINE 25 milliGRAM(s) Oral every 6 hours PRN Pruritus  docusate sodium 100 milliGRAM(s) Oral two times a day PRN For stool softening  glycerin Suppository - Adult 1 Suppository(s) Rectal at bedtime PRN Constipation  hydrocortisone 1% Cream 1 Application(s) Topical every 6 hours PRN Moderate Pain (4-6)  magnesium hydroxide Suspension 30 milliLiter(s) Oral two times a day PRN Constipation  oxyCODONE    IR 5 milliGRAM(s) Oral every 3 hours PRN Moderate to Severe Pain (4-10)  oxyCODONE    IR 5 milliGRAM(s) Oral once PRN Moderate to Severe Pain (4-10)  pramoxine 1%/zinc 5% Cream 1 Application(s) Topical every 4 hours PRN Moderate Pain (4-6)  simethicone 80 milliGRAM(s) Chew every 4 hours PRN Gas  witch hazel Pads 1 Application(s) Topical every 4 hours PRN Perineal discomfort

## 2019-06-26 NOTE — DISCHARGE NOTE OB - CARE PLAN
Principal Discharge DX:	Postpartum state  Goal:	safe discharge to home  Assessment and plan of treatment:	Regular diet, normal activity, nothing in the vagina for 6 weeks--no sex, tampons, tub baths, or swimming pools.

## 2019-06-26 NOTE — DISCHARGE NOTE OB - CARE PROVIDER_API CALL
Waldo Figueroa)  Obstetrics and Gynecology  [address], [facility], [place] [address]  Phone: [phone]  Fax: [phone]  Follow Up Time:

## 2019-06-26 NOTE — DISCHARGE NOTE OB - PLAN OF CARE
safe discharge to home Regular diet, normal activity, nothing in the vagina for 6 weeks--no sex, tampons, tub baths, or swimming pools.

## 2019-06-26 NOTE — DISCHARGE NOTE OB - PATIENT PORTAL LINK FT
You can access the [facility] Patient Portal, offered by [facility], by registering with the following website: [email or web address]

## 2019-06-26 NOTE — PROGRESS NOTE ADULT - SUBJECTIVE AND OBJECTIVE BOX
Attending Note  Patient c/o body aches, no chills  VS- stable, afebrile  Breasts-soft. No erythema .  ABd- soft,appropriately distended, fundus 4cm below umbilicus, firm, non-tender  Pelvic- No swelling or ecchymosis,scant lochia rubra. not malodorous  EXT- no edema, meena's negative  Lab                      9.8    24.48 )-----------( 236      ( 2019 07:02 )             30.4     IMP:s/p indx of labor/Chorioamnionitis/ Inevitable   Plan: d/c home in am           Continue IV amp/gent for 24hr           f/u 4wks for pp care           continue prenatal vit

## 2019-06-27 VITALS
HEART RATE: 87 BPM | TEMPERATURE: 98 F | RESPIRATION RATE: 18 BRPM | DIASTOLIC BLOOD PRESSURE: 73 MMHG | SYSTOLIC BLOOD PRESSURE: 110 MMHG | OXYGEN SATURATION: 100 %

## 2019-06-27 PROCEDURE — 36415 COLL VENOUS BLD VENIPUNCTURE: CPT

## 2019-06-27 PROCEDURE — 88305 TISSUE EXAM BY PATHOLOGIST: CPT

## 2019-06-27 PROCEDURE — 86901 BLOOD TYPING SEROLOGIC RH(D): CPT

## 2019-06-27 PROCEDURE — 84550 ASSAY OF BLOOD/URIC ACID: CPT

## 2019-06-27 PROCEDURE — 85730 THROMBOPLASTIN TIME PARTIAL: CPT

## 2019-06-27 PROCEDURE — 85384 FIBRINOGEN ACTIVITY: CPT

## 2019-06-27 PROCEDURE — 87040 BLOOD CULTURE FOR BACTERIA: CPT

## 2019-06-27 PROCEDURE — 88300 SURGICAL PATH GROSS: CPT

## 2019-06-27 PROCEDURE — 86850 RBC ANTIBODY SCREEN: CPT

## 2019-06-27 PROCEDURE — 85025 COMPLETE CBC W/AUTO DIFF WBC: CPT

## 2019-06-27 PROCEDURE — 86900 BLOOD TYPING SEROLOGIC ABO: CPT

## 2019-06-27 PROCEDURE — 83615 LACTATE (LD) (LDH) ENZYME: CPT

## 2019-06-27 PROCEDURE — 80053 COMPREHEN METABOLIC PANEL: CPT

## 2019-06-27 PROCEDURE — 86780 TREPONEMA PALLIDUM: CPT

## 2019-06-27 PROCEDURE — 85610 PROTHROMBIN TIME: CPT

## 2019-06-27 RX ADMIN — Medication 100 MILLIGRAM(S): at 06:25

## 2019-06-27 RX ADMIN — Medication 600 MILLIGRAM(S): at 00:09

## 2019-06-27 RX ADMIN — Medication 600 MILLIGRAM(S): at 07:15

## 2019-06-27 RX ADMIN — Medication 600 MILLIGRAM(S): at 00:08

## 2019-06-27 RX ADMIN — Medication 600 MILLIGRAM(S): at 06:24

## 2019-06-27 NOTE — PROGRESS NOTE ADULT - SUBJECTIVE AND OBJECTIVE BOX
Patient evaluated at bedside.     She reports pain is well controlled. Deneis fevers, chills    She has been ambulating without assistance, voiding spontaneously     She denies HA, dizziness, chest pain, palpitations, shortness of breath, n/v, heavy vaginal bleeding or perineal discomfort.    Physical Exam:  Vital Signs Last 24 Hrs  T(C): 36.6 (27 Jun 2019 06:23), Max: 36.9 (26 Jun 2019 18:00)  T(F): 97.9 (27 Jun 2019 06:23), Max: 98.5 (26 Jun 2019 18:00)  HR: 87 (27 Jun 2019 06:23) (65 - 89)  BP: 110/73 (27 Jun 2019 06:23) (89/57 - 110/73)  BP(mean): --  RR: 18 (27 Jun 2019 06:23) (17 - 18)  SpO2: 100% (27 Jun 2019 06:23) (97% - 100%)    GA: NAD, A+0 x 3  Abd: + BS, soft, nontender, nondistended, no rebound or guarding, uterus firm at midline  : lochia WNL  Extremities: no edema or calf tenderness                          9.8    24.48 )-----------( 236      ( 26 Jun 2019 07:02 )             30.4     06-26    137  |  104  |  5<L>  ----------------------------<  211<H>  4.0   |  18<L>  |  0.54    Ca    9.4      26 Jun 2019 07:02    TPro  6.7  /  Alb  3.4  /  TBili  0.3  /  DBili  x   /  AST  20  /  ALT  8<L>  /  AlkPhos  66  06-26    PT/INR - ( 25 Jun 2019 18:45 )   PT: 11.6 sec;   INR: 1.03          PTT - ( 25 Jun 2019 18:45 )  PTT:30.6 sec

## 2019-06-27 NOTE — PROGRESS NOTE ADULT - ASSESSMENT
A/P yo 34y  s/p , PP#2 s/p previable delivery , stable    Chorio  -s/p A/G  1. Pain: OPM  2. GI: Reg  3. :  Voiding  4. DVT prophylaxis: SCDs, ambulation  5. Dispo: PP#2

## 2019-06-28 LAB — SURGICAL PATHOLOGY STUDY: SIGNIFICANT CHANGE UP

## 2019-07-01 LAB
CULTURE RESULTS: SIGNIFICANT CHANGE UP
CULTURE RESULTS: SIGNIFICANT CHANGE UP
SPECIMEN SOURCE: SIGNIFICANT CHANGE UP
SPECIMEN SOURCE: SIGNIFICANT CHANGE UP

## 2019-07-02 DIAGNOSIS — Z3A.22 22 WEEKS GESTATION OF PREGNANCY: ICD-10-CM

## 2019-07-02 DIAGNOSIS — Z34.82 ENCOUNTER FOR SUPERVISION OF OTHER NORMAL PREGNANCY, SECOND TRIMESTER: ICD-10-CM

## 2019-07-02 DIAGNOSIS — O26.872 CERVICAL SHORTENING, SECOND TRIMESTER: ICD-10-CM

## 2019-07-02 DIAGNOSIS — O41.1220 CHORIOAMNIONITIS, SECOND TRIMESTER, NOT APPLICABLE OR UNSPECIFIED: ICD-10-CM

## 2019-07-02 DIAGNOSIS — Z37.1 SINGLE STILLBIRTH: ICD-10-CM

## 2019-07-04 LAB — SURGICAL PATHOLOGY STUDY: SIGNIFICANT CHANGE UP

## 2019-07-24 ENCOUNTER — EMERGENCY (EMERGENCY)
Facility: HOSPITAL | Age: 34
LOS: 1 days | Discharge: HOME | End: 2019-07-24
Attending: EMERGENCY MEDICINE | Admitting: EMERGENCY MEDICINE
Payer: COMMERCIAL

## 2019-07-24 VITALS
TEMPERATURE: 98 F | RESPIRATION RATE: 18 BRPM | OXYGEN SATURATION: 100 % | DIASTOLIC BLOOD PRESSURE: 77 MMHG | HEART RATE: 68 BPM | SYSTOLIC BLOOD PRESSURE: 115 MMHG

## 2019-07-24 VITALS
HEART RATE: 75 BPM | SYSTOLIC BLOOD PRESSURE: 111 MMHG | WEIGHT: 169.98 LBS | OXYGEN SATURATION: 100 % | DIASTOLIC BLOOD PRESSURE: 73 MMHG | HEIGHT: 67 IN | TEMPERATURE: 98 F | RESPIRATION RATE: 18 BRPM

## 2019-07-24 DIAGNOSIS — Z91.040 LATEX ALLERGY STATUS: ICD-10-CM

## 2019-07-24 DIAGNOSIS — R10.13 EPIGASTRIC PAIN: ICD-10-CM

## 2019-07-24 DIAGNOSIS — K21.9 GASTRO-ESOPHAGEAL REFLUX DISEASE WITHOUT ESOPHAGITIS: ICD-10-CM

## 2019-07-24 DIAGNOSIS — K80.50 CALCULUS OF BILE DUCT WITHOUT CHOLANGITIS OR CHOLECYSTITIS WITHOUT OBSTRUCTION: ICD-10-CM

## 2019-07-24 LAB
ALBUMIN SERPL ELPH-MCNC: 4.4 G/DL — SIGNIFICANT CHANGE UP (ref 3.3–5)
ALP SERPL-CCNC: 95 U/L — SIGNIFICANT CHANGE UP (ref 40–120)
ALT FLD-CCNC: 29 U/L — SIGNIFICANT CHANGE UP (ref 10–45)
ANION GAP SERPL CALC-SCNC: 10 MMOL/L — SIGNIFICANT CHANGE UP (ref 5–17)
ANION GAP SERPL CALC-SCNC: 12 MMOL/L — SIGNIFICANT CHANGE UP (ref 5–17)
APTT BLD: 33 SEC — SIGNIFICANT CHANGE UP (ref 27.5–36.3)
AST SERPL-CCNC: 91 U/L — HIGH (ref 10–40)
BASOPHILS # BLD AUTO: 0.01 K/UL — SIGNIFICANT CHANGE UP (ref 0–0.2)
BASOPHILS NFR BLD AUTO: 0.1 % — SIGNIFICANT CHANGE UP (ref 0–2)
BILIRUB SERPL-MCNC: 0.3 MG/DL — SIGNIFICANT CHANGE UP (ref 0.2–1.2)
BUN SERPL-MCNC: 10 MG/DL — SIGNIFICANT CHANGE UP (ref 7–23)
BUN SERPL-MCNC: 9 MG/DL — SIGNIFICANT CHANGE UP (ref 7–23)
CALCIUM SERPL-MCNC: 9.3 MG/DL — SIGNIFICANT CHANGE UP (ref 8.4–10.5)
CALCIUM SERPL-MCNC: 9.5 MG/DL — SIGNIFICANT CHANGE UP (ref 8.4–10.5)
CHLORIDE SERPL-SCNC: 104 MMOL/L — SIGNIFICANT CHANGE UP (ref 96–108)
CHLORIDE SERPL-SCNC: 108 MMOL/L — SIGNIFICANT CHANGE UP (ref 96–108)
CO2 SERPL-SCNC: 23 MMOL/L — SIGNIFICANT CHANGE UP (ref 22–31)
CO2 SERPL-SCNC: 24 MMOL/L — SIGNIFICANT CHANGE UP (ref 22–31)
CREAT SERPL-MCNC: 0.86 MG/DL — SIGNIFICANT CHANGE UP (ref 0.5–1.3)
CREAT SERPL-MCNC: 0.88 MG/DL — SIGNIFICANT CHANGE UP (ref 0.5–1.3)
EOSINOPHIL # BLD AUTO: 0.11 K/UL — SIGNIFICANT CHANGE UP (ref 0–0.5)
EOSINOPHIL NFR BLD AUTO: 1.4 % — SIGNIFICANT CHANGE UP (ref 0–6)
GLUCOSE SERPL-MCNC: 100 MG/DL — HIGH (ref 70–99)
GLUCOSE SERPL-MCNC: 91 MG/DL — SIGNIFICANT CHANGE UP (ref 70–99)
HCT VFR BLD CALC: 35.9 % — SIGNIFICANT CHANGE UP (ref 34.5–45)
HGB BLD-MCNC: 11 G/DL — LOW (ref 11.5–15.5)
IMM GRANULOCYTES NFR BLD AUTO: 0.4 % — SIGNIFICANT CHANGE UP (ref 0–1.5)
INR BLD: 1.01 — SIGNIFICANT CHANGE UP (ref 0.88–1.16)
LIDOCAIN IGE QN: 87 U/L — HIGH (ref 7–60)
LYMPHOCYTES # BLD AUTO: 2.02 K/UL — SIGNIFICANT CHANGE UP (ref 1–3.3)
LYMPHOCYTES # BLD AUTO: 25.2 % — SIGNIFICANT CHANGE UP (ref 13–44)
MCHC RBC-ENTMCNC: 27.4 PG — SIGNIFICANT CHANGE UP (ref 27–34)
MCHC RBC-ENTMCNC: 30.6 GM/DL — LOW (ref 32–36)
MCV RBC AUTO: 89.3 FL — SIGNIFICANT CHANGE UP (ref 80–100)
MONOCYTES # BLD AUTO: 0.33 K/UL — SIGNIFICANT CHANGE UP (ref 0–0.9)
MONOCYTES NFR BLD AUTO: 4.1 % — SIGNIFICANT CHANGE UP (ref 2–14)
NEUTROPHILS # BLD AUTO: 5.52 K/UL — SIGNIFICANT CHANGE UP (ref 1.8–7.4)
NEUTROPHILS NFR BLD AUTO: 68.8 % — SIGNIFICANT CHANGE UP (ref 43–77)
NRBC # BLD: 0 /100 WBCS — SIGNIFICANT CHANGE UP (ref 0–0)
PLATELET # BLD AUTO: 235 K/UL — SIGNIFICANT CHANGE UP (ref 150–400)
POTASSIUM SERPL-MCNC: 4.2 MMOL/L — SIGNIFICANT CHANGE UP (ref 3.5–5.3)
POTASSIUM SERPL-MCNC: SIGNIFICANT CHANGE UP MMOL/L (ref 3.5–5.3)
POTASSIUM SERPL-SCNC: 4.2 MMOL/L — SIGNIFICANT CHANGE UP (ref 3.5–5.3)
POTASSIUM SERPL-SCNC: SIGNIFICANT CHANGE UP MMOL/L (ref 3.5–5.3)
PROT SERPL-MCNC: 7.6 G/DL — SIGNIFICANT CHANGE UP (ref 6–8.3)
PROTHROM AB SERPL-ACNC: 11.4 SEC — SIGNIFICANT CHANGE UP (ref 10–12.9)
RBC # BLD: 4.02 M/UL — SIGNIFICANT CHANGE UP (ref 3.8–5.2)
RBC # FLD: 13.8 % — SIGNIFICANT CHANGE UP (ref 10.3–14.5)
SODIUM SERPL-SCNC: 139 MMOL/L — SIGNIFICANT CHANGE UP (ref 135–145)
SODIUM SERPL-SCNC: 142 MMOL/L — SIGNIFICANT CHANGE UP (ref 135–145)
WBC # BLD: 8.02 K/UL — SIGNIFICANT CHANGE UP (ref 3.8–10.5)
WBC # FLD AUTO: 8.02 K/UL — SIGNIFICANT CHANGE UP (ref 3.8–10.5)

## 2019-07-24 PROCEDURE — 85610 PROTHROMBIN TIME: CPT

## 2019-07-24 PROCEDURE — 96375 TX/PRO/DX INJ NEW DRUG ADDON: CPT

## 2019-07-24 PROCEDURE — 36415 COLL VENOUS BLD VENIPUNCTURE: CPT

## 2019-07-24 PROCEDURE — 96374 THER/PROPH/DIAG INJ IV PUSH: CPT

## 2019-07-24 PROCEDURE — 83690 ASSAY OF LIPASE: CPT

## 2019-07-24 PROCEDURE — 99284 EMERGENCY DEPT VISIT MOD MDM: CPT | Mod: 25

## 2019-07-24 PROCEDURE — 76705 ECHO EXAM OF ABDOMEN: CPT

## 2019-07-24 PROCEDURE — 80076 HEPATIC FUNCTION PANEL: CPT

## 2019-07-24 PROCEDURE — 80053 COMPREHEN METABOLIC PANEL: CPT

## 2019-07-24 PROCEDURE — 85730 THROMBOPLASTIN TIME PARTIAL: CPT

## 2019-07-24 PROCEDURE — 76705 ECHO EXAM OF ABDOMEN: CPT | Mod: 26

## 2019-07-24 PROCEDURE — 80048 BASIC METABOLIC PNL TOTAL CA: CPT

## 2019-07-24 PROCEDURE — 85025 COMPLETE CBC W/AUTO DIFF WBC: CPT

## 2019-07-24 PROCEDURE — 99284 EMERGENCY DEPT VISIT MOD MDM: CPT

## 2019-07-24 RX ORDER — SUCRALFATE 1 G
1 TABLET ORAL ONCE
Refills: 0 | Status: COMPLETED | OUTPATIENT
Start: 2019-07-24 | End: 2019-07-24

## 2019-07-24 RX ORDER — FAMOTIDINE 10 MG/ML
20 INJECTION INTRAVENOUS ONCE
Refills: 0 | Status: COMPLETED | OUTPATIENT
Start: 2019-07-24 | End: 2019-07-24

## 2019-07-24 RX ORDER — ONDANSETRON 8 MG/1
4 TABLET, FILM COATED ORAL ONCE
Refills: 0 | Status: COMPLETED | OUTPATIENT
Start: 2019-07-24 | End: 2019-07-24

## 2019-07-24 RX ORDER — LIDOCAINE 4 G/100G
10 CREAM TOPICAL ONCE
Refills: 0 | Status: COMPLETED | OUTPATIENT
Start: 2019-07-24 | End: 2019-07-24

## 2019-07-24 RX ORDER — SODIUM CHLORIDE 9 MG/ML
1000 INJECTION INTRAMUSCULAR; INTRAVENOUS; SUBCUTANEOUS ONCE
Refills: 0 | Status: COMPLETED | OUTPATIENT
Start: 2019-07-24 | End: 2019-07-24

## 2019-07-24 RX ORDER — OMEPRAZOLE 10 MG/1
1 CAPSULE, DELAYED RELEASE ORAL
Qty: 14 | Refills: 0
Start: 2019-07-24 | End: 2019-08-06

## 2019-07-24 RX ADMIN — Medication 1 GRAM(S): at 21:37

## 2019-07-24 RX ADMIN — FAMOTIDINE 20 MILLIGRAM(S): 10 INJECTION INTRAVENOUS at 20:43

## 2019-07-24 RX ADMIN — ONDANSETRON 4 MILLIGRAM(S): 8 TABLET, FILM COATED ORAL at 20:43

## 2019-07-24 RX ADMIN — SODIUM CHLORIDE 1000 MILLILITER(S): 9 INJECTION INTRAMUSCULAR; INTRAVENOUS; SUBCUTANEOUS at 20:44

## 2019-07-24 RX ADMIN — Medication 30 MILLILITER(S): at 20:43

## 2019-07-24 NOTE — ED PROVIDER NOTE - ATTENDING CONTRIBUTION TO CARE
Pt w/ PMHx p/w abd pain, onset today, while going home from work. The pain is epigastric, strong, spasming into the chest and throat, as well as diffuse upper abd. Pt had similar pain 1 week ago and the pain resolved on its own. Pain is subsided on exam. No n/v/d/c. No f/c. on 6/25 pt had premature labor 2/2 incompetent cervix, lose baby at 5 months, followed by D&C for RPOC. Pt started iron 3 days ago for anemia. No alcohol use. Pt w/ PMHx p/w abd pain, onset today, while going home from work. The pain is epigastric, strong, spasming into the chest and throat, as well as diffuse upper abd. Pt had similar pain 1 week ago and the pain resolved on its own. Pain is subsided on exam. No n/v/d/c. No f/c. on 6/25 pt had premature labor 2/2 incompetent cervix, loss of baby at 5 months, followed by D&C for RPOC. Pt started iron 3 days ago for anemia. No alcohol use.  Constitutional: Well appearing, well nourished, awake, alert, oriented to person, place, time/situation and in no apparent distress.  ENMT: Airway patent. Normal MM  Eyes: Clear bilaterally  Cardiac: Normal rate, regular rhythm.  Heart sounds S1, S2.  No murmurs, rubs or gallops.  Respiratory: Breaths sounds equal and clear b/l. No increased WOB, tachypnea, hypoxia, or accessory mm use. Pt speaks in full sentences.   Gastrointestinal: Abd soft, ND, NABS. + ttp in the epigastric region. No Chun's sign. No guarding, rebound, or rigidity. No pulsatile abdominal masses. No organomegaly appreciated. No CVAT   Musculoskeletal: Range of motion is not limited  Neuro: Alert and oriented x 3, face symmetric and speech fluent. Strength 5/5 x 4 ext and symmetric, nml gross motor movement, nml gait. No focal deficits noted.  Skin: Skin normal color for race, warm, dry and intact. No evidence of rash.  Psych: Alert and oriented to person, place, time/situation. normal mood and affect. no apparent risk to self or others.   Epigastric / upper abd pain, radiating into chest and throat. DDx includes but not limited to GERD, esophageal spasm, biliary colic, pancreatitis, cholecystitis, other pathology. Sx resolving spontaneously. GI cocktail. Check labs, RUQ sono. Dispo pending w/u and clinical status

## 2019-07-24 NOTE — ED PROVIDER NOTE - NSFOLLOWUPINSTRUCTIONS_ED_ALL_ED_FT
You were evaluated in the ED for abdominal pain. You had an ultrasound which showed gallstones, without infection of the gallbladder. Your blood work showed mildly elevated liver enzymes, without evidence of obstruction. The remainder of your blood work was normal. This can be repeated with your primary medical doctor. Follow up with a general surgeon for the gallbladder disease. If you have worsening pain in the Right upper quadrant of your abdomen, fever, pale colored stools or other concerning symptoms, return to the ED. Avoid greasy, fatty foods, and foods high in acidity (citrus, tomatoes, coffee, alcohol, chocolate, etc). Take the antiacid prescribed daily. You may also used over-the-counter Maalox / Gaviscon or Tums for reflux.     Gastroesophageal Reflux Disease    WHAT YOU NEED TO KNOW:    Gastroesophageal reflux occurs when acid and food in the stomach back up into the esophagus. Gastroesophageal reflux disease (GERD) is reflux that occurs more than twice a week for a few weeks. It usually causes heartburn and other symptoms. GERD can cause other health problems over time if it is not treated.    DISCHARGE INSTRUCTIONS:    Return to the emergency department if:     You feel full and cannot burp or vomit.      You have severe chest pain and sudden trouble breathing.      Your bowel movements are black, bloody, or tarry-looking.      Your vomit looks like coffee grounds or has blood in it.    Contact your healthcare provider if:     You vomit large amounts, or you vomit often.      You have trouble breathing after you vomit.       You have trouble swallowing, or pain with swallowing.      You are losing weight without trying.       Your symptoms get worse or do not improve with treatment.      You have questions or concerns about your condition or care.    Medicines:     Medicines are used to decrease stomach acid. Medicine may also be used to help your lower esophageal sphincter and stomach contract (tighten) more.      Take your medicine as directed. Contact your healthcare provider if you think your medicine is not helping or if you have side effects. Tell him of her if you are allergic to any medicine. Keep a list of the medicines, vitamins, and herbs you take. Include the amounts, and when and why you take them. Bring the list or the pill bottles to follow-up visits. Carry your medicine list with you in case of an emergency.    Manage GERD:     Do not have foods or drinks that may increase heartburn. These include chocolate, peppermint, fried or fatty foods, drinks that contain caffeine, or carbonated drinks (soda). Other foods include spicy foods, onions, tomatoes, and tomato-based foods. Do not have foods or drinks that can irritate your esophagus, such as citrus fruits, juices, and alcohol.      Do not eat large meals. When you eat a lot of food at one time, your stomach needs more acid to digest it. Eat 6 small meals each day instead of 3 large ones, and eat slowly. Do not eat meals 2 to 3 hours before bedtime.      Elevate the head of your bed. Place 6-inch blocks under the head of your bed frame. You may also use more than one pillow under your head and shoulders while you sleep.      Maintain a healthy weight. If you are overweight, weight loss may help relieve symptoms of GERD.      Do not smoke. Smoking weakens the lower esophageal sphincter and increases the risk of GERD. Ask your healthcare provider for information if you currently smoke and need help to quit. E-cigarettes or smokeless tobacco still contain nicotine. Talk to your healthcare provider before you use these products.       Do not wear clothing that is tight around your waist. Tight clothing can put pressure on your stomach and cause or worsen GERD symptoms.     Follow up with your healthcare provider as directed: Write down your questions so you remember to ask them during your visits.    Esophageal Spasm    WHAT YOU NEED TO KNOW:    Esophageal spasm is a sudden, painful tightening of your lower esophagus. Your esophagus is the tube that food and liquids pass through from your mouth to your stomach. You may have trouble when you swallow. Food may get stuck in your esophagus. You may also have pain in your chest and heartburn.     DISCHARGE INSTRUCTIONS:    Medicines: Ask about these and other medicines you may be given:     Pain medicine: This medicine helps take away or decrease pain caused by the spasms.       Smooth muscle relaxants: This medicine may help your muscles and esophagus relax so it is easier for you to swallow. It may also decrease your pain and trouble swallowing.       Proton pump inhibitors: This medicine may help reduce stomach acid and prevent heartburn.      Take your medicine as directed. Contact your healthcare provider if you think your medicine is not helping or if you have side effects. Tell him or her if you are allergic to any medicine. Keep a list of the medicines, vitamins, and herbs you take. Include the amounts, and when and why you take them. Bring the list or the pill bottles to follow-up visits. Carry your medicine list with you in case of an emergency.    Follow up with your healthcare provider as directed: Write down your questions so you remember to ask them during your visits.    Contact your healthcare provider if:     Your symptoms do not improve even with treatment.       You have severe pain when you swallow.      You lose weight without trying.      You have questions about your condition or care.    Return to the emergency department if:     You are drooling or have trouble swallowing .      You are choking, gagging, or vomiting.      You have pain when you swallow.      You have new or worse chest pain and shortness of breath.       Biliary Colic    WHAT YOU NEED TO KNOW:    Biliary colic is severe pain in your upper abdomen caused by a gallbladder problem. Your gallbladder stores bile, which helps break down the fats that you eat. Gallbladder         DISCHARGE INSTRUCTIONS:    Medicines:     Medicines can help decrease pain and muscle spasms. You may also need medicine to calm your stomach and stop vomiting.      Take your medicine as directed. Contact your healthcare provider if you think your medicine is not helping or you have side effects. Tell him if you are allergic to any medicine. Keep a list of the medicines, vitamins, and herbs you take. Include the amounts, and when and why you take them. Bring the list or the pill bottles to follow-up visits. Carry your medicine list with you in case of an emergency.    Avoid alcohol: Alcohol can damage your gallbladder and make your symptoms worse.    Maintain a healthy weight: Ask your healthcare provider how much you should weigh. Ask him to help you create a weight loss plan if you are overweight.     Eat a variety of healthy foods: Healthy foods include fruits, vegetables, whole-grain breads, low-fat dairy products, beans, lean meats, and fish. Foods that are high in fiber and low in fat and cholesterol may decrease your symptoms. Ask if you need to be on a special diet.    Exercise: Ask your healthcare provider about the best exercise plan for you. Exercise may help improve your symptoms.    Follow up with your healthcare provider as directed: Bring a list of any questions you have so you remember to ask them during your visits.     Contact your healthcare provider if:     You have a fever.      Your pain gets worse, even after you take medicine.      You have nausea or are vomiting.      Your skin or eyes are yellow.      You have questions or concerns about your condition or care.    Return to the emergency department if:     You have severe pain.      You feel like you are going to faint.      You are short of breath.

## 2019-07-24 NOTE — ED PROVIDER NOTE - CARE PLAN
Principal Discharge DX:	Abdominal pain Principal Discharge DX:	Abdominal pain  Secondary Diagnosis:	Biliary colic  Secondary Diagnosis:	Gastroesophageal reflux disease without esophagitis

## 2019-07-24 NOTE — ED ADULT NURSE NOTE - OBJECTIVE STATEMENT
35 y/o female w/ hx of anemia presents to ED c/o epigastric pain associated w/ nausea that began about one week ago. Pain slightly improved, but came back today after stepping into elevator at work. Pt noticed burning sensation in esophagus. Denies fever, chills, vomiting, diarrhea, hematuria, and any other sx.

## 2019-07-24 NOTE — ED PROVIDER NOTE - CLINICAL SUMMARY MEDICAL DECISION MAKING FREE TEXT BOX
35 yo female s/p  labor/stillbirth 1 month ago ( 5month GA), in the Er due to sudden onset of intense mid epigastric pain radiating to mid sternal area, with some associated nausea, but no vomiting. Pt with soft abdomen, but significant tenderness to mid epigastric area on exam. Plan : labs, RUQ US, pain control, re-evaluate. possibility of gastritis, esophageal spasm and or acute cholecystitis discussed with pt. dispo based on her results and clinical re-evaluation.

## 2019-07-24 NOTE — ED PROVIDER NOTE - CARE PROVIDER_API CALL
Luis F Pires)  Surgery  [address], [place] [address]  Phone: [phone]  Fax: [phone]  Follow Up Time:     Jimenez Blanton)  Surgery  [address], [address]  [place] [address]  Phone: [phone]  Fax: [phone]  Follow Up Time:

## 2019-07-24 NOTE — ED PROVIDER NOTE - PROGRESS NOTE DETAILS
Pt feeling better and requesting d/c. D/w pt diet modifications for both biliary colic and GERD. Start antiacid. D/w pt close f/u surgery. Return precautions given Pt feeling better and requesting d/c. D/w pt diet modifications for both biliary colic and GERD. Start antiacid. D/w pt close f/u surgery, mildly elevated LFTs in non obstructive pattern - to recheck w/ PCP. Return precautions given

## 2019-07-24 NOTE — ED PROVIDER NOTE - CHPI ED SYMPTOMS NEG
no constipation, no history of gastritis, no history of gallstones/no blood in stool/no vomiting no burning urination/no constipation, no history of gastritis, no history of gallstones/no blood in stool/no fever/no vomiting/no chills

## 2019-07-24 NOTE — ED PROVIDER NOTE - OBJECTIVE STATEMENT
35 y/o F with PMHx of anemia (started on meds 3 days ago) presents to the ED complaining of mid epigastric pain with associated nausea that began one week ago. Patient notes that the pain subsided, but was aggravated today after she stepped into an elevator at work. Pain is described as stabbing and constant. She also notes a burning sensation in her esophagus and is not passing gas. Patient ate steak and broccoli for lunch and reports having regular bowel movements, but today has dark stool. Denies constipation, blood in stool, vomiting, history of gastritis, or history of gallstones. Patient also mentions that she had a miscarriage one month ago. 33 y/o F with PMHx of anemia (started on iron supplements 3 days ago) presents to the ED complaining of intense mid epigastric pain radiating to her mid chest and almost to her throat. Pain started suddenly, also with associated nausea. Pt also reports similar episode of the same pain one week ago. Patient notes that the pain subsided.  Pain is described as stabbing and constant, with some burning sensations.  Patient reports having regular bowel movements, mentioned had dark stool earlier today. Denies constipation, blood in stool, vomiting, history of gastritis, or history of gallstones. Patient also mentions that she had lost a baby( stillbirth) one month ago.

## 2019-07-24 NOTE — ED ADULT NURSE NOTE - NSIMPLEMENTINTERV_GEN_ALL_ED
Implemented All Universal Safety Interventions:  [place] to call system. Call bell, personal items and telephone within reach. Instruct patient to call for assistance. Room bathroom lighting operational. Non-slip footwear when patient is off stretcher. Physically safe environment: no spills, clutter or unnecessary equipment. Stretcher in lowest position, wheels locked, appropriate side rails in place.

## 2019-07-29 PROBLEM — D64.9 ANEMIA, UNSPECIFIED: Chronic | Status: ACTIVE | Noted: 2019-07-24

## 2019-08-05 ENCOUNTER — APPOINTMENT (OUTPATIENT)
Age: 34
End: 2019-08-05
Payer: COMMERCIAL

## 2019-08-05 VITALS
TEMPERATURE: 97.8 F | OXYGEN SATURATION: 98 % | SYSTOLIC BLOOD PRESSURE: 114 MMHG | DIASTOLIC BLOOD PRESSURE: 77 MMHG | BODY MASS INDEX: 28.27 KG/M2 | WEIGHT: 178 LBS | HEIGHT: 66.5 IN | HEART RATE: 64 BPM

## 2019-08-05 PROCEDURE — 99203 OFFICE O/P NEW LOW 30 MIN: CPT

## 2019-08-26 ENCOUNTER — MEDICATION RENEWAL (OUTPATIENT)
Age: 34
End: 2019-08-26

## 2019-09-24 LAB
M TB IFN-G BLD-IMP: NEGATIVE
QUANTIFERON TB PLUS MITOGEN MINUS NIL: >10 IU/ML
QUANTIFERON TB PLUS NIL: 0.06 IU/ML
QUANTIFERON TB PLUS TB1 MINUS NIL: -0.01 IU/ML
QUANTIFERON TB PLUS TB2 MINUS NIL: 0.02 IU/ML

## 2019-10-16 ENCOUNTER — APPOINTMENT (OUTPATIENT)
Dept: INTERNAL MEDICINE | Facility: CLINIC | Age: 34
End: 2019-10-16
Payer: COMMERCIAL

## 2019-10-16 ENCOUNTER — EMERGENCY (EMERGENCY)
Facility: HOSPITAL | Age: 34
LOS: 1 days | Discharge: HOME | End: 2019-10-16
Attending: EMERGENCY MEDICINE | Admitting: EMERGENCY MEDICINE
Payer: COMMERCIAL

## 2019-10-16 VITALS
SYSTOLIC BLOOD PRESSURE: 112 MMHG | DIASTOLIC BLOOD PRESSURE: 77 MMHG | HEIGHT: 67 IN | TEMPERATURE: 98 F | RESPIRATION RATE: 18 BRPM | OXYGEN SATURATION: 98 % | HEART RATE: 84 BPM | WEIGHT: 169.98 LBS

## 2019-10-16 VITALS
HEIGHT: 66.5 IN | DIASTOLIC BLOOD PRESSURE: 70 MMHG | HEART RATE: 86 BPM | WEIGHT: 170 LBS | OXYGEN SATURATION: 98 % | TEMPERATURE: 99.1 F | BODY MASS INDEX: 27 KG/M2 | SYSTOLIC BLOOD PRESSURE: 110 MMHG

## 2019-10-16 VITALS
DIASTOLIC BLOOD PRESSURE: 79 MMHG | OXYGEN SATURATION: 99 % | SYSTOLIC BLOOD PRESSURE: 116 MMHG | TEMPERATURE: 98 F | HEART RATE: 87 BPM | RESPIRATION RATE: 16 BRPM

## 2019-10-16 DIAGNOSIS — R07.89 OTHER CHEST PAIN: ICD-10-CM

## 2019-10-16 DIAGNOSIS — R06.02 SHORTNESS OF BREATH: ICD-10-CM

## 2019-10-16 LAB
ALBUMIN SERPL ELPH-MCNC: 4.3 G/DL — SIGNIFICANT CHANGE UP (ref 3.3–5)
ALP SERPL-CCNC: 73 U/L — SIGNIFICANT CHANGE UP (ref 40–120)
ALT FLD-CCNC: 26 U/L — SIGNIFICANT CHANGE UP (ref 10–45)
ANION GAP SERPL CALC-SCNC: 12 MMOL/L — SIGNIFICANT CHANGE UP (ref 5–17)
APPEARANCE UR: CLEAR — SIGNIFICANT CHANGE UP
APTT BLD: 31 SEC — SIGNIFICANT CHANGE UP (ref 27.5–36.3)
AST SERPL-CCNC: 25 U/L — SIGNIFICANT CHANGE UP (ref 10–40)
BACTERIA # UR AUTO: PRESENT /HPF
BASOPHILS # BLD AUTO: 0.02 K/UL — SIGNIFICANT CHANGE UP (ref 0–0.2)
BASOPHILS NFR BLD AUTO: 0.4 % — SIGNIFICANT CHANGE UP (ref 0–2)
BILIRUB SERPL-MCNC: 0.2 MG/DL — SIGNIFICANT CHANGE UP (ref 0.2–1.2)
BILIRUB UR-MCNC: NEGATIVE — SIGNIFICANT CHANGE UP
BUN SERPL-MCNC: 7 MG/DL — SIGNIFICANT CHANGE UP (ref 7–23)
CALCIUM SERPL-MCNC: 9.4 MG/DL — SIGNIFICANT CHANGE UP (ref 8.4–10.5)
CHLORIDE SERPL-SCNC: 109 MMOL/L — HIGH (ref 96–108)
CK MB CFR SERPL CALC: <1 NG/ML — SIGNIFICANT CHANGE UP (ref 0–6.7)
CK SERPL-CCNC: 103 U/L — SIGNIFICANT CHANGE UP (ref 25–170)
CO2 SERPL-SCNC: 24 MMOL/L — SIGNIFICANT CHANGE UP (ref 22–31)
COLOR SPEC: YELLOW — SIGNIFICANT CHANGE UP
CREAT SERPL-MCNC: 0.8 MG/DL — SIGNIFICANT CHANGE UP (ref 0.5–1.3)
D DIMER BLD IA.RAPID-MCNC: 161 NG/ML DDU — SIGNIFICANT CHANGE UP
DIFF PNL FLD: ABNORMAL
EOSINOPHIL # BLD AUTO: 0.13 K/UL — SIGNIFICANT CHANGE UP (ref 0–0.5)
EOSINOPHIL NFR BLD AUTO: 2.4 % — SIGNIFICANT CHANGE UP (ref 0–6)
EPI CELLS # UR: ABNORMAL /HPF (ref 0–5)
GLUCOSE SERPL-MCNC: 93 MG/DL — SIGNIFICANT CHANGE UP (ref 70–99)
GLUCOSE UR QL: NEGATIVE — SIGNIFICANT CHANGE UP
HCG UR QL: NEGATIVE — SIGNIFICANT CHANGE UP
HCT VFR BLD CALC: 41.4 % — SIGNIFICANT CHANGE UP (ref 34.5–45)
HGB BLD-MCNC: 12.8 G/DL — SIGNIFICANT CHANGE UP (ref 11.5–15.5)
IMM GRANULOCYTES NFR BLD AUTO: 0 % — SIGNIFICANT CHANGE UP (ref 0–1.5)
INR BLD: 0.99 — SIGNIFICANT CHANGE UP (ref 0.88–1.16)
KETONES UR-MCNC: NEGATIVE — SIGNIFICANT CHANGE UP
LEUKOCYTE ESTERASE UR-ACNC: NEGATIVE — SIGNIFICANT CHANGE UP
LYMPHOCYTES # BLD AUTO: 2.76 K/UL — SIGNIFICANT CHANGE UP (ref 1–3.3)
LYMPHOCYTES # BLD AUTO: 51.5 % — HIGH (ref 13–44)
MCHC RBC-ENTMCNC: 26.2 PG — LOW (ref 27–34)
MCHC RBC-ENTMCNC: 30.9 GM/DL — LOW (ref 32–36)
MCV RBC AUTO: 84.8 FL — SIGNIFICANT CHANGE UP (ref 80–100)
MONOCYTES # BLD AUTO: 0.38 K/UL — SIGNIFICANT CHANGE UP (ref 0–0.9)
MONOCYTES NFR BLD AUTO: 7.1 % — SIGNIFICANT CHANGE UP (ref 2–14)
NEUTROPHILS # BLD AUTO: 2.07 K/UL — SIGNIFICANT CHANGE UP (ref 1.8–7.4)
NEUTROPHILS NFR BLD AUTO: 38.6 % — LOW (ref 43–77)
NITRITE UR-MCNC: NEGATIVE — SIGNIFICANT CHANGE UP
NRBC # BLD: 0 /100 WBCS — SIGNIFICANT CHANGE UP (ref 0–0)
PH UR: 6 — SIGNIFICANT CHANGE UP (ref 5–8)
PLATELET # BLD AUTO: 197 K/UL — SIGNIFICANT CHANGE UP (ref 150–400)
POTASSIUM SERPL-MCNC: 4.2 MMOL/L — SIGNIFICANT CHANGE UP (ref 3.5–5.3)
POTASSIUM SERPL-SCNC: 4.2 MMOL/L — SIGNIFICANT CHANGE UP (ref 3.5–5.3)
PROT SERPL-MCNC: 7.7 G/DL — SIGNIFICANT CHANGE UP (ref 6–8.3)
PROT UR-MCNC: NEGATIVE MG/DL — SIGNIFICANT CHANGE UP
PROTHROM AB SERPL-ACNC: 11.2 SEC — SIGNIFICANT CHANGE UP (ref 10–12.9)
RBC # BLD: 4.88 M/UL — SIGNIFICANT CHANGE UP (ref 3.8–5.2)
RBC # FLD: 14.4 % — SIGNIFICANT CHANGE UP (ref 10.3–14.5)
RBC CASTS # UR COMP ASSIST: < 5 /HPF — SIGNIFICANT CHANGE UP
SODIUM SERPL-SCNC: 145 MMOL/L — SIGNIFICANT CHANGE UP (ref 135–145)
SP GR SPEC: 1.02 — SIGNIFICANT CHANGE UP (ref 1–1.03)
TROPONIN T SERPL-MCNC: <0.01 NG/ML — SIGNIFICANT CHANGE UP (ref 0–0.01)
UROBILINOGEN FLD QL: 0.2 E.U./DL — SIGNIFICANT CHANGE UP
WBC # BLD: 5.36 K/UL — SIGNIFICANT CHANGE UP (ref 3.8–10.5)
WBC # FLD AUTO: 5.36 K/UL — SIGNIFICANT CHANGE UP (ref 3.8–10.5)
WBC UR QL: < 5 /HPF — SIGNIFICANT CHANGE UP

## 2019-10-16 PROCEDURE — 85730 THROMBOPLASTIN TIME PARTIAL: CPT

## 2019-10-16 PROCEDURE — 84702 CHORIONIC GONADOTROPIN TEST: CPT

## 2019-10-16 PROCEDURE — 71046 X-RAY EXAM CHEST 2 VIEWS: CPT | Mod: 26

## 2019-10-16 PROCEDURE — 81025 URINE PREGNANCY TEST: CPT

## 2019-10-16 PROCEDURE — 86140 C-REACTIVE PROTEIN: CPT

## 2019-10-16 PROCEDURE — 84484 ASSAY OF TROPONIN QUANT: CPT

## 2019-10-16 PROCEDURE — 36415 COLL VENOUS BLD VENIPUNCTURE: CPT

## 2019-10-16 PROCEDURE — 71275 CT ANGIOGRAPHY CHEST: CPT

## 2019-10-16 PROCEDURE — 99203 OFFICE O/P NEW LOW 30 MIN: CPT

## 2019-10-16 PROCEDURE — 93306 TTE W/DOPPLER COMPLETE: CPT | Mod: 26

## 2019-10-16 PROCEDURE — 71046 X-RAY EXAM CHEST 2 VIEWS: CPT

## 2019-10-16 PROCEDURE — 80053 COMPREHEN METABOLIC PANEL: CPT

## 2019-10-16 PROCEDURE — 85025 COMPLETE CBC W/AUTO DIFF WBC: CPT

## 2019-10-16 PROCEDURE — 85610 PROTHROMBIN TIME: CPT

## 2019-10-16 PROCEDURE — 85652 RBC SED RATE AUTOMATED: CPT

## 2019-10-16 PROCEDURE — 99284 EMERGENCY DEPT VISIT MOD MDM: CPT | Mod: 25

## 2019-10-16 PROCEDURE — 93306 TTE W/DOPPLER COMPLETE: CPT

## 2019-10-16 PROCEDURE — 71275 CT ANGIOGRAPHY CHEST: CPT | Mod: 26

## 2019-10-16 PROCEDURE — 87184 SC STD DISK METHOD PER PLATE: CPT

## 2019-10-16 PROCEDURE — 81001 URINALYSIS AUTO W/SCOPE: CPT

## 2019-10-16 PROCEDURE — 87086 URINE CULTURE/COLONY COUNT: CPT

## 2019-10-16 PROCEDURE — 83880 ASSAY OF NATRIURETIC PEPTIDE: CPT

## 2019-10-16 PROCEDURE — 82550 ASSAY OF CK (CPK): CPT

## 2019-10-16 PROCEDURE — 82553 CREATINE MB FRACTION: CPT

## 2019-10-16 PROCEDURE — 99285 EMERGENCY DEPT VISIT HI MDM: CPT

## 2019-10-16 PROCEDURE — 85379 FIBRIN DEGRADATION QUANT: CPT

## 2019-10-16 PROCEDURE — 99284 EMERGENCY DEPT VISIT MOD MDM: CPT

## 2019-10-16 RX ORDER — HEPARIN SODIUM 5000 [USP'U]/ML
INJECTION INTRAVENOUS; SUBCUTANEOUS
Qty: 25000 | Refills: 0 | Status: DISCONTINUED | OUTPATIENT
Start: 2019-10-16 | End: 2019-10-16

## 2019-10-16 RX ORDER — HEPARIN SODIUM 5000 [USP'U]/ML
6500 INJECTION INTRAVENOUS; SUBCUTANEOUS ONCE
Refills: 0 | Status: DISCONTINUED | OUTPATIENT
Start: 2019-10-16 | End: 2019-10-16

## 2019-10-16 RX ORDER — SODIUM CHLORIDE 9 MG/ML
1000 INJECTION INTRAMUSCULAR; INTRAVENOUS; SUBCUTANEOUS ONCE
Refills: 0 | Status: COMPLETED | OUTPATIENT
Start: 2019-10-16 | End: 2019-10-16

## 2019-10-16 RX ORDER — ALBUTEROL 90 UG/1
2 AEROSOL, METERED ORAL
Qty: 1 | Refills: 0
Start: 2019-10-16 | End: 2019-11-14

## 2019-10-16 RX ADMIN — SODIUM CHLORIDE 1000 MILLILITER(S): 9 INJECTION INTRAMUSCULAR; INTRAVENOUS; SUBCUTANEOUS at 12:49

## 2019-10-16 NOTE — HISTORY OF PRESENT ILLNESS
[[email or web address]] : Acute onset of shortness of breath for one week; Recently started on Oral contraceptives;  Also history of 6 spontaneous \par abortions;

## 2019-10-16 NOTE — ED ADULT TRIAGE NOTE - CHIEF COMPLAINT QUOTE
Patient c/o shortness of breath and chest pressure since thursday . On birth control pills . Was sent by Dr. Hidalgo for r/o PE .

## 2019-10-16 NOTE — CONSULT NOTE ADULT - SUBJECTIVE AND OBJECTIVE BOX
HPI:  34 F w/ no sig pmhx presents to [facility] ER with symptoms of shortness of breath since Thursday progressively worse, associated with episode of left sided pleuritic chest discomfort. Patient is a new patient of Dr. Hidalgo, who sent her to ER for evaluation. Patient has never had these symptoms before. States the SOB was sudden on Thursday while doing the dishes, and associated with sharp chest pain. Her symptoms are both at rest and worse with exertion. 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 She takes OCP at home. Denies any calf swelling or leg pain. No recent flights or long trips.. No sick contacts.       CARDIAC DIAGNOSTIC TESTING:      TELEMETRY:    ECG: normal sinus rhythm     ECHO  FINDINGS:  < from: Echocardiogram (10.16.19 @ 15:56) >  CONCLUSIONS:     1. Normal left and right ventricular size and systolic function.   2. No significant valvular disease.   3. Small pericardial effusion without evidence of cardiac tamponade.      PAST MEDICAL & SURGICAL HISTORY:  Anemia  No significant past surgical history      HOME MEDICATIONS  T(C): 36.7 (10-16-19 @ 18:03), Max: 36.9 (10-16-19 @ 12:09)  HR: 87 (10-16-19 @ 18:03) (80 - 87)  BP: 116/79 (10-16-19 @ 18:03) (102/70 - 116/79)  RR: 16 (10-16-19 @ 18:03) (16 - 18)  SpO2: 99% (10-16-19 @ 18:03) (98% - 99%)    MEDICATIONS  (STANDING):    MEDICATIONS  (PRN):      FAMILY HISTORY:      SOCIAL HISTORY:  - Smoking: no   - Alcohol: no     REVIEW OF SYSTEMS:  CONSTITUTIONAL: No fever, weight loss, or fatigue  EYES: No eye pain, visual disturbances, or discharge  ENMT:  No difficulty hearing, tinnitus, vertigo; No sinus or throat pain  NECK: No pain or stiffness  RESPIRATORY: +SOB see HPI  CARDIOVASCULAR: No chest pain, palpitations, passing out, dizziness, or leg swelling  GASTROINTESTINAL: No abdominal or epigastric pain. No nausea, vomiting, or hematemesis; No diarrhea or constipation. No melena or hematochezia.  GENITOURINARY: No dysuria, frequency, hematuria, or incontinence  NEUROLOGICAL: No headaches, memory loss, loss of strength, numbness, or tremors  SKIN: No itching, burning, rashes, or lesions   LYMPH Nodes: No enlarged glands  ENDOCRINE: No heat or cold intolerance; No hair loss  MUSCULOSKELETAL: No joint pain or swelling; No muscle, back, or extremity pain  PSYCHIATRIC: No depression, anxiety, mood swings, or difficulty sleeping  HEME/LYMPH: No easy bruising, or bleeding gums  ALLERY AND IMMUNOLOGIC: No hives or eczema    Vitals past 24 Hours: T(C): 36.7 (10-16-19 @ 18:03), Max: 36.9 (10-16-19 @ 12:09)  HR: 87 (10-16-19 @ 18:03) (80 - 87)  BP: 116/79 (10-16-19 @ 18:03) (102/70 - 116/79)  RR: 16 (10-16-19 @ 18:03) (16 - 18)  SpO2: 99% (10-16-19 @ 18:03) (98% - 99%)	    PHYSICAL EXAM:  GEN: No acute respiratory distress, appears stated age	  HEENT: Anicteric sclera, PERRL, EOMI, no oropharyngeal erythema or discharge, trachea midline  Lymphatic: No cervical lymphadenopathy  Cardiovascular: S1 S2, No JVD, No murmurs, PMI  Respiratory: Lungs clear to auscultation, no rrw  Gastrointestinal:  BS+, soft, non distended, non tender, no HSM  Skin: No rashes, No ecchymoses, No cyanosis  Neurologic: Non-focal, AAO x 3, strength grossly normal in all extremeties  Extremities: Normal range of motion, No clubbing, cyanosis or edema  Vascular: Radial 2+, DP 2+      I&O's Detail      LABS:                        12.8   5.36  )-----------( 197      ( 16 Oct 2019 12:48 )             41.4     10-    145  |  109<H>  |  7   ----------------------------<  93  4.2   |  24  |  0.80    Ca    9.4      16 Oct 2019 12:48    TPro  7.7  /  Alb  4.3  /  TBili  0.2  /  DBili  x   /  AST  25  /  ALT  26  /  AlkPhos  73  10-    CARDIAC MARKERS ( 16 Oct 2019 12:48 )  x     / <0.01 ng/mL / 103 U/L / x     / <1.0 ng/mL      PT/INR - ( 16 Oct 2019 12:48 )   PT: 11.2 sec;   INR: 0.99          PTT - ( 16 Oct 2019 12:48 )  PTT:31.0 sec  Urinalysis Basic - ( 16 Oct 2019 15:09 )    Color: Yellow / Appearance: Clear / S.025 / pH: x  Gluc: x / Ketone: NEGATIVE  / Bili: Negative / Urobili: 0.2 E.U./dL   Blood: x / Protein: NEGATIVE mg/dL / Nitrite: NEGATIVE   Leuk Esterase: NEGATIVE / RBC: < 5 /HPF / WBC < 5 /HPF   Sq Epi: x / Non Sq Epi: 5-10 /HPF / Bacteria: Present /HPF      I&O's Summary      RADIOLOGY & ADDITIONAL STUDIES:  < from: Echocardiogram (10.16.19 @ 15:56) >  CONCLUSIONS:     1. Normal left and right ventricular size and systolic function.   2. No significant valvular disease.   3. Small pericardial effusion without evidence of cardiac tamponade.      < from: CT Angio Chest PE Protocol w/ IV Cont (10.16.19 @ 14:08) >  FINDINGS: CT angiography of the chest was performed with the   administration of intravenous contrast. Multiplanar and maximum intensity   projection 3D reconstructions were performed in the sagittal and coronal   planes. No prior studies are available for comparison.    Evaluation of the chest demonstrates that the visualized thyroid gland is   normal in appearance. Heart is normal in size. Negative for thoracic   inlet, axillary, mediastinal or hilar lymphadenopathy. Trace bilateral   pleural effusions. Small volume of pericardial effusion. Negative for   central or segmental pulmonary embolism. Evaluation of the lung   parenchyma demonstrates no endobronchial lesion. No pulmonary   consolidation or mass. Evaluation of the abdomen is unremarkable.   Evaluation the osseous structures are unremarkable.          IMPRESSION:    Trace bilateral pleural effusions and small pericardial effusion.     No miller central or segmental pulmonary embolus.    < end of copied text >

## 2019-10-16 NOTE — ED ADULT NURSE NOTE - OBJECTIVE STATEMENT
c.o sob since Thursday. denies any cough, fever/chill, chest pain, palpitations. states she is on birth control. denies any hx of smoking or recent travel

## 2019-10-16 NOTE — CONSULT NOTE ADULT - ATTENDING COMMENTS
34 year old female w/ new onset shortness of breath. Cardiology consulted for evaluation   The patient has a significant history of miscarriages.   Reports sudden onset of shortness of breath few days ago. had a single episode of chest pain, now resolved and with shortness of breath, both at rest and on exertion  When examined the patient is in no distress  Labs, vitals, physical exam wnl  CT chest negative for PE  EKG wnl  Echo wnl    In the setting of above, after discussing with the patient, recommend outpatient follow up with pulmonary and cardiology.   Recommended to return to the ED immediately if new onset of chest pain or if worsening of sob.

## 2019-10-16 NOTE — ED PROVIDER NOTE - NSFOLLOWUPINSTRUCTIONS_ED_ALL_ED_FT
Shortness of Breath, Adult  Shortness of breath is when a person has trouble breathing enough air or when a person feels like she or he is having trouble breathing in enough air. Shortness of breath could be a sign of a medical problem.  Follow these instructions at home:  Image   Pay attention to any changes in your symptoms.Do not use any products that contain nicotine or tobacco, such as cigarettes, e-cigarettes, and chewing tobacco. Do not smoke. Smoking is a common cause of shortness of breath. If you need help quitting, ask your health care provider.Avoid things that can irritate your airways, such as:  Mold.Dust.Air pollution.Chemical fumes.Things that can cause allergy symptoms (allergens), if you have allergies.Keep your living space clean and free of mold and dust.Rest as needed. Slowly return to your usual activities.Take over-the-counter and prescription medicines only as told by your health care provider. This includes oxygen therapy and inhaled medicines.Keep all follow-up visits as told by your health care provider. This is important.Contact a health care provider if:  Your condition does not improve as soon as expected.You have a hard time doing your normal activities, even after you rest.You have new symptoms.Get help right away if:  Your shortness of breath gets worse.You have shortness of breath when you are resting.You feel light-headed or you faint.You have a cough that is not controlled with medicines.You cough up blood.You have pain with breathing.You have pain in your chest, arms, shoulders, or abdomen.You have a fever.You cannot walk up stairs or exercise the way that you normally do.These symptoms may represent a serious problem that is an emergency. Do not wait to see if the symptoms will go away. Get medical help right away. Call your local emergency services (911 in the [place] Do not drive yourself to the hospital.   Summary  Shortness of breath is when a person has trouble breathing enough air. It can be a sign of a medical problem.Avoid things that irritate your lungs, such as smoking, pollution, mold, and dust.Pay attention to changes in your symptoms and contact your health care provider if you have a hard time completing daily activities because of shortness of breath.This information is not intended to replace advice given to you by your health care provider. Make sure you discuss any questions you have with your health care provider.

## 2019-10-16 NOTE — ED PROVIDER NOTE - PATIENT PORTAL LINK FT
You can access the FollowMyHealth Patient Portal offered by [facility] by registering at the following website: [email or web address]. By joining [facility]’s FollowMyHealth portal, you will also be able to view your health information using other applications (apps) compatible with our system.

## 2019-10-16 NOTE — ED PROVIDER NOTE - PROGRESS NOTE DETAILS
Mirta Garvin: No PE, but small pericardial effusion on CT, highest suspicion for pericarditis. Case discussed with Dr. Hidalgo and decided to request cardiac consult and send pt for echo. Dispo pending cardiology email.

## 2019-10-16 NOTE — ED PROVIDER NOTE - OBJECTIVE STATEMENT
33 y/o F presents to the ED with SOB x 6 days and L pleuritic chest discomfort radiating to her back, SOB worse with exertion, but chest pain not affected by exertion. Denies cough, fever, history of DVTs or PEs; however, pt has a history of 6 miscarriages with 1 live birth and suspicion for coagulation disorder. Pt is currently taking OCPs. Nonsmoker.

## 2019-10-16 NOTE — ASSESSMENT
[FreeTextEntry1] : Patient was sent to ED to rule out PE; She is on oral contraceptives; Also with 6 spontaneous abortions concerned \par about Antiphospholipid antibody syndrome;\par PERC team called

## 2019-10-16 NOTE — PHYSICAL EXAM
[Normal Rate] : normal rate  [de-identified] : clear [de-identified] : 100 [de-identified] : no edema

## 2019-10-16 NOTE — ED PROVIDER NOTE - TOBACCO USE
Neck Spasm: Exercises  Your Care Instructions  Here are some examples of typical rehabilitation exercises for your condition. Start each exercise slowly. Ease off the exercise if you start to have pain. Your doctor or physical therapist will tell you when you can start these exercises and which ones will work best for you. How to do the exercises  Levator scapula stretch    1. Sit in a firm chair, or stand up straight. 2. Gently tilt your head toward your left shoulder. 3. Turn your head to look down into your armpit, bending your head slightly forward. Let the weight of your head stretch your neck muscles. 4. Hold for 15 to 30 seconds. 5. Return to your starting position. 6. Follow the same instructions above, but tilt your head toward your right shoulder. 7. Repeat 2 to 4 times toward each shoulder. Upper trapezius stretch    1. Sit in a firm chair, or stand up straight. 2. This stretch works best if you keep your shoulder down as you lean away from it. To help you remember to do this, start by relaxing your shoulders and lightly holding on to your thighs or your chair. 3. Tilt your head toward your shoulder and hold for 15 to 30 seconds. Let the weight of your head stretch your muscles. 4. If you would like a little added stretch, place your arm behind your back. Use the arm opposite of the direction you are tilting your head. For example, if you are tilting your head to the left, place your right arm behind your back. 5. Repeat 2 to 4 times toward each shoulder. Neck rotation    1. Sit in a firm chair, or stand up straight. 2. Keeping your chin level, turn your head to the right, and hold for 15 to 30 seconds. 3. Turn your head to the left, and hold for 15 to 30 seconds. 4. Repeat 2 to 4 times to each side. Chin tuck    1. Lie on the floor with a rolled-up towel under your neck. Your head should be touching the floor. 2. Slowly bring your chin toward the front of your neck.   3. Hold for a count of 6, and then relax for up to 10 seconds. 4. Repeat 8 to 12 times. Forward neck flexion    1. Sit in a firm chair, or stand up straight. 2. Bend your head forward. 3. Hold for 15 to 30 seconds, then return to your starting position. 4. Repeat 2 to 4 times. Follow-up care is a key part of your treatment and safety. Be sure to make and go to all appointments, and call your doctor if you are having problems. It's also a good idea to know your test results and keep a list of the medicines you take. Where can you learn more? Go to http://paz-sophie.info/. Enter P962 in the search box to learn more about \"Neck Spasm: Exercises. \"  Current as of: September 20, 2018  Content Version: 11.9  © 9789-0245 [employer]. Care instructions adapted under license by [employer] (which disclaims liability or warranty for this information). If you have questions about a medical condition or this instruction, always ask your healthcare professional. [place] [address] any warranty or liability for your use of this information. Never smoker

## 2019-10-16 NOTE — ED PROVIDER NOTE - CLINICAL SUMMARY MEDICAL DECISION MAKING FREE TEXT BOX
33 y/o F presents with SOB and pleuritic chest discomfort, clear lungs on exam. Very high suspicion for PE, no apparent ischemia on EKG, less likely ACS. Stat CTA ordered due to high suspicion, will initiate heparin immediately while awaiting results. If large PE, will contact PERT team. 33 y/o F presents with SOB and pleuritic chest discomfort, clear lungs on exam. Very high suspicion for PE, no apparent ischemia on EKG, less likely ACS. Stat CTA ordered due to high suspicion

## 2019-10-16 NOTE — CONSULT NOTE ADULT - ASSESSMENT
34 year old female w/ new onset shortness of breath. Cardiology consulted for evaluation     #Shortness of breath: Progressive x 1 week with associated pleuritic chest discomfort. CT Angio of lungs were done given high thromboembolic risk (multiple unknown miscarriages and OCP use), which was negative. Echocardiogram was done which showed a small pericardial effusion (without tamponade), normal ejection fraction, and no valvular disease. Small pericardial effusion is unlikely causing her symptoms. No significant pleural effusions or pneumonia seen on CTA or CXR. Labs including troponin were all within normal limits. Etiology of shortness of breath is still unclear. Patient remains HD stable with normal physical exam. These findings were discussed w/ patients primary physician. Patient feels safe and comfortable for discharge in ER. Discussed w/ patient symptoms to look out for and need for re-visit to ER. Patient to make appointment to see Dr. Hidalgo this week. OK for discharge from cardiology perspective     Discussed w/ attending

## 2019-10-16 NOTE — ED PROVIDER NOTE - CONSTITUTIONAL, MLM
normal... Pt appears conversationally dyspneic with obvious tachypnea, worse with exertion. Awake, alert, oriented to person, place, time/situation.

## 2019-10-18 ENCOUNTER — RX RENEWAL (OUTPATIENT)
Age: 34
End: 2019-10-18

## 2019-10-19 LAB
-  AMPICILLIN: SIGNIFICANT CHANGE UP
-  CLINDAMYCIN: SIGNIFICANT CHANGE UP
-  ERYTHROMYCIN: SIGNIFICANT CHANGE UP
-  LEVOFLOXACIN: SIGNIFICANT CHANGE UP
-  PENICILLIN: SIGNIFICANT CHANGE UP
-  VANCOMYCIN: SIGNIFICANT CHANGE UP
CULTURE RESULTS: SIGNIFICANT CHANGE UP
METHOD TYPE: SIGNIFICANT CHANGE UP
ORGANISM # SPEC MICROSCOPIC CNT: SIGNIFICANT CHANGE UP
ORGANISM # SPEC MICROSCOPIC CNT: SIGNIFICANT CHANGE UP
SPECIMEN SOURCE: SIGNIFICANT CHANGE UP

## 2020-02-03 NOTE — ED ADULT NURSE NOTE - NS ED NURSE LEVEL OF CONSCIOUSNESS MENTAL STATUS
Awake/Alert/Cooperative Ketoconazole Counseling:   Patient counseled regarding improving absorption with orange juice.  Adverse effects include but are not limited to breast enlargement, headache, diarrhea, nausea, upset stomach, liver function test abnormalities, taste disturbance, and stomach pain.  There is a rare possibility of liver failure that can occur when taking ketoconazole. The patient understands that monitoring of LFTs may be required, especially at baseline. The patient verbalized understanding of the proper use and possible adverse effects of ketoconazole.  All of the patient's questions and concerns were addressed.

## 2020-02-24 ENCOUNTER — RX RENEWAL (OUTPATIENT)
Age: 35
End: 2020-02-24

## 2020-03-10 NOTE — DISCHARGE NOTE OB - BREASTFEEDING PROVIDES STABLE TEMPERATURE THROUGH SKIN TO SKIN CONTACT
On 03/10/2020    Sent patient's H&P, EKG and labs to DR BARRON-  [phone].  Electronically Signed By: Carol Martienz CMA     Statement Selected

## 2020-03-26 ENCOUNTER — TRANSCRIPTION ENCOUNTER (OUTPATIENT)
Age: 35
End: 2020-03-26

## 2020-04-25 ENCOUNTER — MESSAGE (OUTPATIENT)
Age: 35
End: 2020-04-25

## 2020-08-08 LAB
M TB IFN-G BLD-IMP: NEGATIVE
QUANTIFERON TB PLUS MITOGEN MINUS NIL: 7.75 IU/ML
QUANTIFERON TB PLUS NIL: 0.01 IU/ML
QUANTIFERON TB PLUS TB1 MINUS NIL: 0.01 IU/ML
QUANTIFERON TB PLUS TB2 MINUS NIL: 0 IU/ML

## 2020-08-08 NOTE — PATIENT PROFILE OB - POST PARTUM DEPRESSION SCREEN OB 2
no dizziness/no fever/no weakness/no confusion/no change in level of consciousness/no loss of consciousness/no vomiting
no

## 2020-08-09 LAB
AMPHET UR-MCNC: NEGATIVE
BARBITURATES UR-MCNC: NEGATIVE
BENZODIAZ UR-MCNC: NEGATIVE
COCAINE METAB.OTHER UR-MCNC: NEGATIVE
CREATININE, URINE: 131.4 MG/DL
METHADONE UR-MCNC: NEGATIVE
METHAQUALONE UR-MCNC: NEGATIVE
OPIATES UR-MCNC: NEGATIVE
PCP UR-MCNC: NEGATIVE
PROPOXYPH UR QL: NEGATIVE
THC UR QL: NEGATIVE

## 2020-09-29 ENCOUNTER — RESULT REVIEW (OUTPATIENT)
Age: 35
End: 2020-09-29

## 2020-09-29 ENCOUNTER — OUTPATIENT (OUTPATIENT)
Dept: OUTPATIENT SERVICES | Facility: HOSPITAL | Age: 35
LOS: 1 days | End: 2020-09-29
Payer: COMMERCIAL

## 2020-09-29 ENCOUNTER — APPOINTMENT (OUTPATIENT)
Dept: ORTHOPEDIC SURGERY | Facility: CLINIC | Age: 35
End: 2020-09-29
Payer: COMMERCIAL

## 2020-09-29 VITALS
BODY MASS INDEX: 26.2 KG/M2 | DIASTOLIC BLOOD PRESSURE: 70 MMHG | HEIGHT: 66.5 IN | SYSTOLIC BLOOD PRESSURE: 110 MMHG | OXYGEN SATURATION: 98 % | HEART RATE: 74 BPM | WEIGHT: 165 LBS | TEMPERATURE: 97.9 F

## 2020-09-29 PROCEDURE — 99203 OFFICE O/P NEW LOW 30 MIN: CPT

## 2020-09-29 PROCEDURE — 73110 X-RAY EXAM OF WRIST: CPT | Mod: 26,RT

## 2020-09-29 RX ORDER — OSELTAMIVIR PHOSPHATE 75 MG/1
75 CAPSULE ORAL TWICE DAILY
Qty: 10 | Refills: 0 | Status: COMPLETED | COMMUNITY
Start: 2018-02-07 | End: 2020-09-29

## 2020-09-29 RX ORDER — CIPROFLOXACIN HYDROCHLORIDE 250 MG/1
250 TABLET, FILM COATED ORAL
Qty: 14 | Refills: 0 | Status: COMPLETED | COMMUNITY
Start: 2017-09-06 | End: 2020-09-29

## 2020-09-29 RX ORDER — AZITHROMYCIN 250 MG/1
250 TABLET, FILM COATED ORAL
Qty: 1 | Refills: 1 | Status: COMPLETED | COMMUNITY
Start: 2020-04-05 | End: 2020-09-29

## 2020-09-29 RX ORDER — VITAMIN C, CALCIUM, IRON, VITAMIN D3, VITAMIN E, THIAMIN, RIBOFLAVIN, NIACINAMIDE, VITAMIN B6, FOLIC ACID, IODINE, ZINC, COPPER, DOCUSATE SODIUM
27-1 & 250 KIT
Qty: 1 | Refills: 8 | Status: COMPLETED | COMMUNITY
Start: 2017-11-30 | End: 2020-09-29

## 2020-09-29 RX ORDER — OMEPRAZOLE 40 MG/1
40 CAPSULE, DELAYED RELEASE ORAL
Qty: 30 | Refills: 0 | Status: COMPLETED | COMMUNITY
Start: 2019-10-18 | End: 2020-09-29

## 2020-09-29 RX ORDER — INDOMETHACIN 25 MG/1
25 CAPSULE ORAL 3 TIMES DAILY
Qty: 21 | Refills: 0 | Status: COMPLETED | COMMUNITY
Start: 2017-08-30 | End: 2020-09-29

## 2020-09-29 RX ORDER — DOXYCYCLINE HYCLATE 100 MG/1
100 CAPSULE ORAL
Qty: 14 | Refills: 0 | Status: COMPLETED | COMMUNITY
Start: 2017-10-02 | End: 2020-09-29

## 2020-09-29 RX ORDER — VALACYCLOVIR 1 G/1
1 TABLET, FILM COATED ORAL
Qty: 20 | Refills: 0 | Status: COMPLETED | COMMUNITY
Start: 2017-12-06 | End: 2020-09-29

## 2020-09-29 RX ORDER — IBUPROFEN 800 MG/1
800 TABLET, FILM COATED ORAL 3 TIMES DAILY
Qty: 90 | Refills: 0 | Status: COMPLETED | COMMUNITY
Start: 2019-08-26 | End: 2020-09-29

## 2020-09-29 RX ORDER — OMEPRAZOLE 40 MG/1
40 CAPSULE, DELAYED RELEASE ORAL DAILY
Qty: 30 | Refills: 2 | Status: COMPLETED | COMMUNITY
Start: 2020-06-01 | End: 2020-09-29

## 2020-09-29 NOTE — DISCUSSION/SUMMARY
[Medication Risks Reviewed] : Medication risks reviewed [de-identified] : The patient is a 35 year old, RHD woman presenting with a 4 week history of atraumatic, right dorsal ulnar-sided pain.  She has pain at the DRUJ and TFCC.  Suspect low grade sprain.\par \par Imaging/Diagnostics were interpreted and results were reviewed with the patient in detail.  All questions were answered appropriately.\par \par The patient was counseled on the natural progression of the problem today.  The patient was provided reassurance today.\par \par For now, continue close observation.  \par \par We discussed short trial of TFCC wrist brace, up to 6 weeks.  We discussed nighttime splinting.\par \par Patient was prescribed a short course of Meloxicam .Appropriate use of medication was reviewed with the patient in detail. Risks, benefits, and adverse effects medication were discussed.\par \par Follow-up in 2-3 weeks.  If pain continues, will consider MRI.\par \par ------------------------------------------------------------------------------------------------------------------\par Patient appreciates and agrees with current plan.\par \par This note was generated using a mixture of manual typing and dragon medical dictation software.  A reasonable effort has been made for proofreading its contents, but typos may still remain.  If there are any questions or points of clarification needed please notify my office.\par \par >30 minutes of time was spent on total encounter.  >50% of the visit was spent on counseling/coordination of care and medical-decision making for this patient.\par \par \par

## 2020-09-29 NOTE — HISTORY OF PRESENT ILLNESS
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  The patient denies precipitating injury or trauma.  She is not particualrly active with exercise.  She does spend many hours on the computer.  She denies focal elbow or shoulder pain.  She has tried Tylenol.  She has occasional paresthesias, nonfocal at distal fingers.  Occasional symptoms on left side.\par \par Pain is mild in intensity, described as achy, improved with rest, worse with some wrist movements.

## 2020-09-29 NOTE — PHYSICAL EXAM
[de-identified] : General: Well-nourished, well-developed, alert, and in no acute distress.\par Head: Normocephalic.\par Eyes: Pupils equal round reactive to light and accommodation, extraocular muscles intact, normal sclera.\par Nose: No nasal discharge.\par Cardiac: Regular rate. Extremities are warm and well perfused. Distal pulses are symmetric bilaterally.\par Respiratory: No labored breathing.\par Extremities: Sensation is intact distally bilaterally.  Distal pulses are symmetric bilaterally\par Lymphatic: No regional lymphadenopathy, no lymphedema\par Neurologic: No focal deficits\par Skin: Normal skin color, texture, and turgor\par Psychiatric: Normal affect\par MSK: as noted above/below\par \par \par \par RIGHT WRIST:\par \par Inspection: no swelling, bruising, deformity\par Joint Effusion: no\par ROM: normal wrist extension, wrist flexion , supination , pronation \par Provocative: MILD pain with ulnar deviation, no pain with radial deviation\par Palpation: PAIN AT TFCC, PAIN AT DRUJ,  no pain in radial styloid, no pain at anatomical snuffbox, no  pain at a scaphoid tubercle, no pain in CMC, no pain at scapholunate,no pain at ulnar styloid, no pain at volar compartment\par Strength: normal pinch grasp, thumb abduction, thumb adduction, thumb opposition, MCP flexion, MCP extension PIP flexion, PIP extension DIP flexion, DIP extension\par Distal Pulses: 2+, normal\par Upper Extremity Sensation: normal\par Upper Extremity Reflexes: normal\par \par Special Tests:\par CMC Grind Test: normal\par Hamate Pull Test: normal\par Tinel: MILDLY POSITIVE\par Phalen: MILDLY POSITIVE\par TFCC Compression: POSITIVE\par Finkelstein Test: normal\par \par LEFT WRIST:\par \par Inspection: no swelling, bruising, deformity\par Joint Effusion: no\par ROM: normal wrist extension, wrist flexion , supination , pronation \par Provocative: no pain with ulnar deviation, no pain with radial deviation\par Palpation: no pain in radial styloid, no pain at anatomical snuffbox, no  pain at a scaphoid tubercle, no pain in CMC, no pain at scapholunate, no pain at DRUJ, no pain at ulnar styloid, no pain at TFCC, no pain at dorsal compartment, no pain at volar compartment\par Strength: normal pinch grasp, thumb abduction, thumb adduction, thumb opposition, MCP flexion, MCP extension PIP flexion, PIP extension DIP flexion, DIP extension\par Distal Pulses: 2+, normal\par Upper Extremity Sensation: normal\par Upper Extremity Reflexes: normal\par \par Special Tests:\par CMC Grind Test: normal\par Hamate Pull Test: normal\par Tinel: normal\par Phalen: normal\par TFCC Compression: normal\par Finkelstein Test: normal\par \par \par \par \par  [de-identified] : Xray RIGHT Wrist - Multiple views were reviewed with the patient in detail.  \par \par IMPRESSION: Frontal, lateral and oblique views of the right wrist constraints no fracture. No dislocation. Radiocarpal, intercarpal, carpometacarpal and MCP joint spaces are preserved. Appropriate carpal alignment.

## 2020-09-30 PROCEDURE — 73110 X-RAY EXAM OF WRIST: CPT

## 2020-10-20 ENCOUNTER — APPOINTMENT (OUTPATIENT)
Dept: ORTHOPEDIC SURGERY | Facility: CLINIC | Age: 35
End: 2020-10-20

## 2020-10-20 NOTE — DISCUSSION/SUMMARY
[Medication Risks Reviewed] : Medication risks reviewed [de-identified] : The patient is a 35 year old, RHD woman presenting with a 4 week history of atraumatic, right dorsal ulnar-sided pain.  She has pain at the DRUJ and TFCC.  Suspect low grade sprain.\par \par \par \par ------------------------------------------------------------------------------------------------------------------\par Patient appreciates and agrees with current plan.\par \par This note was generated using a mixture of manual typing and dragon medical dictation software.  A reasonable effort has been made for proofreading its contents, but typos may still remain.  If there are any questions or points of clarification needed please notify my office.\par \par >15 minutes of time was spent on total encounter.  >50% of the visit was spent on counseling/coordination of care and medical-decision making for this patient.\par \par \par

## 2020-10-20 NOTE — HISTORY OF PRESENT ILLNESS
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  She spends many hours on the computer.  She denies focal elbow or shoulder pain. She has occasional paresthesias, nonfocal at distal fingers.  Occasional symptoms on left side.  Her pain was attributed to DRUJ sprain/TFCC sprain.  She was instructed on use of TFCC wrist brace.\par \par Pain is mild in intensity, described as achy, improved with rest, worse with some wrist movements.

## 2020-10-20 NOTE — PHYSICAL EXAM
[de-identified] : General: Well-nourished, well-developed, alert, and in no acute distress.\par Head: Normocephalic.\par Eyes: Pupils equal round reactive to light and accommodation, extraocular muscles intact, normal sclera.\par Nose: No nasal discharge.\par Cardiac: Regular rate. Extremities are warm and well perfused. Distal pulses are symmetric bilaterally.\par Respiratory: No labored breathing.\par Extremities: Sensation is intact distally bilaterally.  Distal pulses are symmetric bilaterally\par Lymphatic: No regional lymphadenopathy, no lymphedema\par Neurologic: No focal deficits\par Skin: Normal skin color, texture, and turgor\par Psychiatric: Normal affect\par MSK: as noted above/below\par \par \par \par RIGHT WRIST:\par \par Inspection: no swelling, bruising, deformity\par Joint Effusion: no\par ROM: normal wrist extension, wrist flexion , supination , pronation \par Provocative: MILD pain with ulnar deviation, no pain with radial deviation\par Palpation: PAIN AT TFCC, PAIN AT DRUJ,  no pain in radial styloid, no pain at anatomical snuffbox, no  pain at a scaphoid tubercle, no pain in CMC, no pain at scapholunate,no pain at ulnar styloid, no pain at volar compartment\par Strength: normal pinch grasp, thumb abduction, thumb adduction, thumb opposition, MCP flexion, MCP extension PIP flexion, PIP extension DIP flexion, DIP extension\par Distal Pulses: 2+, normal\par Upper Extremity Sensation: normal\par Upper Extremity Reflexes: normal\par \par Special Tests:\par CMC Grind Test: normal\par Hamate Pull Test: normal\par Tinel: MILDLY POSITIVE\par Phalen: MILDLY POSITIVE\par TFCC Compression: POSITIVE\par Finkelstein Test: normal\par \par LEFT WRIST:\par \par Inspection: no swelling, bruising, deformity\par Joint Effusion: no\par ROM: normal wrist extension, wrist flexion , supination , pronation \par Provocative: no pain with ulnar deviation, no pain with radial deviation\par Palpation: no pain in radial styloid, no pain at anatomical snuffbox, no  pain at a scaphoid tubercle, no pain in CMC, no pain at scapholunate, no pain at DRUJ, no pain at ulnar styloid, no pain at TFCC, no pain at dorsal compartment, no pain at volar compartment\par Strength: normal pinch grasp, thumb abduction, thumb adduction, thumb opposition, MCP flexion, MCP extension PIP flexion, PIP extension DIP flexion, DIP extension\par Distal Pulses: 2+, normal\par Upper Extremity Sensation: normal\par Upper Extremity Reflexes: normal\par \par Special Tests:\par CMC Grind Test: normal\par Hamate Pull Test: normal\par Tinel: normal\par Phalen: normal\par TFCC Compression: normal\par Finkelstein Test: normal\par \par \par \par \par

## 2020-10-21 LAB — SARS-COV-2 N GENE NPH QL NAA+PROBE: NOT DETECTED

## 2020-11-10 ENCOUNTER — TRANSCRIPTION ENCOUNTER (OUTPATIENT)
Age: 35
End: 2020-11-10

## 2020-12-02 ENCOUNTER — APPOINTMENT (OUTPATIENT)
Dept: HEART AND VASCULAR | Facility: CLINIC | Age: 35
End: 2020-12-02
Payer: COMMERCIAL

## 2020-12-02 ENCOUNTER — NON-APPOINTMENT (OUTPATIENT)
Age: 35
End: 2020-12-02

## 2020-12-02 VITALS
WEIGHT: 169 LBS | DIASTOLIC BLOOD PRESSURE: 84 MMHG | BODY MASS INDEX: 26.84 KG/M2 | HEART RATE: 75 BPM | SYSTOLIC BLOOD PRESSURE: 115 MMHG | HEIGHT: 66.5 IN | OXYGEN SATURATION: 100 %

## 2020-12-02 DIAGNOSIS — Z87.440 PERSONAL HISTORY OF URINARY (TRACT) INFECTIONS: ICD-10-CM

## 2020-12-02 DIAGNOSIS — R06.02 SHORTNESS OF BREATH: ICD-10-CM

## 2020-12-02 DIAGNOSIS — Z87.19 PERSONAL HISTORY OF OTHER DISEASES OF THE DIGESTIVE SYSTEM: ICD-10-CM

## 2020-12-02 DIAGNOSIS — Z79.2 LONG TERM (CURRENT) USE OF ANTIBIOTICS: ICD-10-CM

## 2020-12-02 DIAGNOSIS — S69.81XA OTHER SPECIFIED INJURIES OF RIGHT WRIST, HAND AND FINGER(S), INITIAL ENCOUNTER: ICD-10-CM

## 2020-12-02 DIAGNOSIS — Z87.39 PERSONAL HISTORY OF OTHER DISEASES OF THE MUSCULOSKELETAL SYSTEM AND CONNECTIVE TISSUE: ICD-10-CM

## 2020-12-02 DIAGNOSIS — Z11.59 ENCOUNTER FOR SCREENING FOR OTHER VIRAL DISEASES: ICD-10-CM

## 2020-12-02 DIAGNOSIS — R53.83 OTHER FATIGUE: ICD-10-CM

## 2020-12-02 PROCEDURE — 99072 ADDL SUPL MATRL&STAF TM PHE: CPT

## 2020-12-02 PROCEDURE — 93000 ELECTROCARDIOGRAM COMPLETE: CPT

## 2020-12-02 PROCEDURE — 99214 OFFICE O/P EST MOD 30 MIN: CPT

## 2020-12-02 NOTE — HISTORY OF PRESENT ILLNESS
[FreeTextEntry1] : Eve Gibbs presents for initial evaluation and management of pericarditis and COVID-19 (04/2020).  She was diagnosed with COVID-19 in April, 2020 and was briefly admitted to [facility] at that time. Since that time, she has complaints of ongoing SOB and altered taste and smell.  She is seen today as an urgent add-on secondary to complaints of chest pressure for the past 3 days.  She describes the pressure as mid-strenla, lasting minutes at a time, nonexertional, and improved with a recumbent position.  Of note, she has a history of 7 prior miscarriages for which she did not have a thorough evaluation.  \par

## 2020-12-02 NOTE — ASSESSMENT
[FreeTextEntry1] : 1. Presumptive COVID pericarditis:  \par       - will check lab work including ESR and CRP \par       - will send for an echocardiogram to r/o pericardial effusion (echo from 10/16/19 with small effusion)\par \par 2. 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 + joint pain and prior pericardial effusion: r/o rheumatologic disorder\par       - will send serologic work up \par \par \par \par \par \par \par

## 2020-12-02 NOTE — REASON FOR VISIT
[Initial Evaluation] : an initial evaluation of [FreeTextEntry1] : Diagnostic Tests:\par -----------------------------------------\par ECG:\par 02/12/20: sinus rhythm, MI and ST changes consistent with pericarditis. \par ______________________________\par Echo:\par 10/16/19: EF 62%, mild pericardial effusion.

## 2020-12-02 NOTE — REVIEW OF SYSTEMS
[Shortness Of Breath] : shortness of breath [Dyspnea on exertion] : dyspnea during exertion [Negative] : Heme/Lymph [Cough] : cough [Joint Pain] : joint pain [FreeTextEntry1] : + altered taste/smell

## 2020-12-03 LAB
24R-OH-CALCIDIOL SERPL-MCNC: 70.7 PG/ML
ALBUMIN SERPL ELPH-MCNC: 4.7 G/DL
ALP BLD-CCNC: 75 U/L
ALT SERPL-CCNC: 12 U/L
ANION GAP SERPL CALC-SCNC: 11 MMOL/L
AST SERPL-CCNC: 13 U/L
AT III PPP CHRO-ACNC: 118 %
BASOPHILS # BLD AUTO: 0.01 K/UL
BASOPHILS NFR BLD AUTO: 0.2 %
BILIRUB SERPL-MCNC: 0.3 MG/DL
BUN SERPL-MCNC: 9 MG/DL
CALCIUM SERPL-MCNC: 10 MG/DL
CARDIOLIPIN AB SER IA-ACNC: NEGATIVE
CHLORIDE SERPL-SCNC: 106 MMOL/L
CHOLEST SERPL-MCNC: 155 MG/DL
CO2 SERPL-SCNC: 24 MMOL/L
CREAT SERPL-MCNC: 0.87 MG/DL
CRP SERPL-MCNC: 0.13 MG/DL
DSDNA AB SER-ACNC: <12 IU/ML
EOSINOPHIL # BLD AUTO: 0.08 K/UL
EOSINOPHIL NFR BLD AUTO: 1.3 %
ERYTHROCYTE [SEDIMENTATION RATE] IN BLOOD BY WESTERGREN METHOD: 17 MM/HR
ESTIMATED AVERAGE GLUCOSE: 111 MG/DL
GLUCOSE SERPL-MCNC: 97 MG/DL
HBA1C MFR BLD HPLC: 5.5 %
HCT VFR BLD CALC: 44.1 %
HDLC SERPL-MCNC: 72 MG/DL
HGB BLD-MCNC: 13 G/DL
IMM GRANULOCYTES NFR BLD AUTO: 0.2 %
LDLC SERPL CALC-MCNC: 68 MG/DL
LDLC SERPL DIRECT ASSAY-MCNC: 68 MG/DL
LYMPHOCYTES # BLD AUTO: 2.38 K/UL
LYMPHOCYTES NFR BLD AUTO: 39.4 %
MAN DIFF?: NORMAL
MCHC RBC-ENTMCNC: 26.9 PG
MCHC RBC-ENTMCNC: 29.5 GM/DL
MCV RBC AUTO: 91.1 FL
MONOCYTES # BLD AUTO: 0.24 K/UL
MONOCYTES NFR BLD AUTO: 4 %
NEUTROPHILS # BLD AUTO: 3.32 K/UL
NEUTROPHILS NFR BLD AUTO: 54.9 %
NONHDLC SERPL-MCNC: 83 MG/DL
PLATELET # BLD AUTO: 246 K/UL
POTASSIUM SERPL-SCNC: 4.3 MMOL/L
PROT C PPP CHRO-ACNC: 133 %
PROT S AG ACT/NOR PPP IA: 92 %
PROT SERPL-MCNC: 7.4 G/DL
RBC # BLD: 4.84 M/UL
RBC # FLD: 13.2 %
RHEUMATOID FACT SER QL: <10 IU/ML
SARS-COV-2 IGG SERPL IA-ACNC: 0.48 INDEX
SARS-COV-2 IGG SERPL QL IA: NEGATIVE
SODIUM SERPL-SCNC: 140 MMOL/L
TRIGL SERPL-MCNC: 76 MG/DL
TSH SERPL-ACNC: 1.05 UIU/ML
WBC # FLD AUTO: 6.04 K/UL

## 2020-12-04 LAB
C3 SERPL-MCNC: 134 MG/DL
C4 SERPL-MCNC: 20 MG/DL

## 2020-12-10 LAB — DNA PLOIDY SPEC FC-IMP: NORMAL

## 2020-12-18 ENCOUNTER — RX RENEWAL (OUTPATIENT)
Age: 35
End: 2020-12-18

## 2021-01-21 ENCOUNTER — APPOINTMENT (OUTPATIENT)
Dept: HEMATOLOGY ONCOLOGY | Facility: CLINIC | Age: 36
End: 2021-01-21
Payer: COMMERCIAL

## 2021-01-21 VITALS
DIASTOLIC BLOOD PRESSURE: 78 MMHG | TEMPERATURE: 97.1 F | SYSTOLIC BLOOD PRESSURE: 110 MMHG | BODY MASS INDEX: 27.32 KG/M2 | WEIGHT: 170 LBS | OXYGEN SATURATION: 97 % | HEART RATE: 97 BPM | HEIGHT: 66 IN

## 2021-01-21 DIAGNOSIS — Z83.438 FAMILY HISTORY OF OTHER DISORDER OF LIPOPROTEIN METABOLISM AND OTHER LIPIDEMIA: ICD-10-CM

## 2021-01-21 DIAGNOSIS — Z82.49 FAMILY HISTORY OF ISCHEMIC HEART DISEASE AND OTHER DISEASES OF THE CIRCULATORY SYSTEM: ICD-10-CM

## 2021-01-21 LAB
APTT BLD: 30.5 SEC
ERYTHROCYTE [SEDIMENTATION RATE] IN BLOOD BY WESTERGREN METHOD: 10 MM/HR
INR PPP: 0.99
PT BLD: 11.9 SEC

## 2021-01-21 PROCEDURE — 99072 ADDL SUPL MATRL&STAF TM PHE: CPT

## 2021-01-21 PROCEDURE — 36415 COLL VENOUS BLD VENIPUNCTURE: CPT

## 2021-01-21 PROCEDURE — 99205 OFFICE O/P NEW HI 60 MIN: CPT | Mod: 25

## 2021-01-21 NOTE — ASSESSMENT
[FreeTextEntry1] : Patient is a 35 year old female with history of COVID-19 in 2020 and otherwise in good health, who has had 6 spontaneous miscarriages and 1 elective  at 6 months due to uterine infection, who presents for further evaluation of possible hypercoagulable state as underlying cause of multiple spontaneous miscarriages. Several genetic thrombophilic tests have been taken and are negative. Will complete the evaluation with further testing. Have ordered APTT, anticardiolipin antibody, B2GP 1 panel, cardiolipin antibody, CBC, DRVVT, hexagonal phase lupus, phosphatidylserine antibodies, prothrombin gene mutation, PT/INR, sed rate. The patient was advised to call to discuss results.\par Addendum: Phosphatidylserine could not be sent as it is no longer [place] state approved.

## 2021-01-21 NOTE — REVIEW OF SYSTEMS
[Joint Pain] : joint pain [Joint Stiffness] : joint stiffness [Negative] : Allergic/Immunologic [FreeTextEntry9] : especially right wrist [de-identified] : sweaty hands and underarms

## 2021-01-21 NOTE — END OF VISIT
[FreeTextEntry3] : All medical record entries made by the Scribe were at my, Dr. Bethany Dixon, direction and personally dictated by me on 01/21/2021. I have reviewed the chart and agree that the record accurately reflects my personal performance of the history, physical exam, assessment and plan. I have also personally directed, reviewed, and agreed with the chart.

## 2021-01-21 NOTE — PHYSICAL EXAM
[Normal] : affect appropriate [de-identified] : overweight [de-identified] : thyroid fullness ? enlarged  [de-identified] : normoactive bowel sounds, soft, mildly tender to palpation in the RUQ, without organomegaly or masses

## 2021-01-21 NOTE — HISTORY OF PRESENT ILLNESS
[de-identified] : Patient is a 35 year old female with history of COVID-19 in 2020 and otherwise in good health, who has had 6 spontaneous miscarriages and 1 elective  at 6 months due to uterine infection (acute chorioamnionitis), who presents for further evaluation of possible hypercoagulable state as underlying cause of multiple spontaneous miscarriages. The 6 spontaneous miscarriages all occurred within the first trimester of pregnancy, one of which resulted after a trial of IVF with fertility specialist Dr. Paulo Ellsworth in . During her 6th pregnancy, she was placed on low dose Aspirin during the last trimester and was able carry fetus almost to term, delivering a healthy baby boy vaginally at 37 weeks in 2018. Previous testing for thrombophilia includes a normal Antithrombin III activity, Protein C activity, Protein S free antigen, cardiolipin antibody screen, and a negative Factor V Leiden mutation. Rheumatoid factor, anti double stranded DNA, and complement studies in December were normal. TSH was normal at that time. She has no family history of thrombotic events, and patient herself has had no past history of deep vein thrombosis, pulmonary embolism, or other obvious thromboses. The patient two months ago developed joint pain, especially in the wrists, for which she is under the care of an orthopedist. Denies fever, chills, night sweats, or recent infection.

## 2021-01-21 NOTE — REASON FOR VISIT
[Initial Consultation] : an initial consultation for [FreeTextEntry2] : Multiple Miscarriages, Possible thrombophilic state

## 2021-01-22 ENCOUNTER — LABORATORY RESULT (OUTPATIENT)
Age: 36
End: 2021-01-22

## 2021-01-22 LAB
APTT HEX PL PPP: NEGATIVE
BASOPHILS # BLD AUTO: 0 K/UL
BASOPHILS # BLD AUTO: 0 K/UL
BASOPHILS NFR BLD AUTO: 0 %
BASOPHILS NFR BLD AUTO: 0 %
CONFIRM: 28.8 SEC
DRVVT IMM 1:2 NP PPP: NORMAL
DRVVT SCREEN TO CONFIRM RATIO: 0.96 RATIO
EOSINOPHIL # BLD AUTO: 0.16 K/UL
EOSINOPHIL # BLD AUTO: 0.16 K/UL
EOSINOPHIL NFR BLD AUTO: 2.6 %
EOSINOPHIL NFR BLD AUTO: 2.6 %
GIANT PLATELETS BLD QL SMEAR: PRESENT
HCT VFR BLD CALC: 39.7 %
HGB BLD-MCNC: 12.6 G/DL
HYPOCHROMIA BLD QL SMEAR: SLIGHT
LYMPHOCYTES # BLD AUTO: 2.2 K/UL
LYMPHOCYTES # BLD AUTO: 2.2 K/UL
LYMPHOCYTES NFR BLD AUTO: 34.8 %
LYMPHOCYTES NFR BLD AUTO: 34.8 %
MAN DIFF?: NORMAL
MCHC RBC-ENTMCNC: 27.5 PG
MCHC RBC-ENTMCNC: 31.7 GM/DL
MCV RBC AUTO: 86.7 FL
MONOCYTES # BLD AUTO: 0.49 K/UL
MONOCYTES # BLD AUTO: 0.49 K/UL
MONOCYTES NFR BLD AUTO: 7.8 %
MONOCYTES NFR BLD AUTO: 7.8 %
MSMEAR: NORMAL
NEUTROPHILS # BLD AUTO: 3.46 K/UL
NEUTROPHILS # BLD AUTO: 3.46 K/UL
NEUTROPHILS NFR BLD AUTO: 54.8 %
NEUTROPHILS NFR BLD AUTO: 54.8 %
OVALOCYTES BLD QL SMEAR: SLIGHT
PLAT MORPH BLD: ABNORMAL
PLATELET # BLD AUTO: 219 K/UL
POIKILOCYTOSIS BLD QL SMEAR: SLIGHT
RBC # BLD: 4.58 M/UL
RBC # FLD: 12.7 %
RBC BLD AUTO: ABNORMAL
SCHISTOCYTES BLD QL SMEAR: SLIGHT
SCREEN DRVVT: 27.7 SEC
WBC # FLD AUTO: 6.31 K/UL

## 2021-01-25 LAB
B2 GLYCOPROT1 IGA SERPL IA-ACNC: <5 SAU
B2 GLYCOPROT1 IGG SER-ACNC: <5 SGU
B2 GLYCOPROT1 IGM SER-ACNC: <5 SMU
CARDIOLIPIN IGM SER-MCNC: 6.5 MPL
CARDIOLIPIN IGM SER-MCNC: <5 GPL
DEPRECATED CARDIOLIPIN IGA SER: <5 APL

## 2021-01-28 LAB — CARDIOLIPIN AB SER IA-ACNC: NEGATIVE

## 2021-02-03 ENCOUNTER — EMERGENCY (EMERGENCY)
Facility: HOSPITAL | Age: 36
LOS: 1 days | Discharge: HOME | End: 2021-02-03
Admitting: EMERGENCY MEDICINE
Payer: COMMERCIAL

## 2021-02-03 VITALS
TEMPERATURE: 98 F | OXYGEN SATURATION: 99 % | SYSTOLIC BLOOD PRESSURE: 120 MMHG | DIASTOLIC BLOOD PRESSURE: 85 MMHG | HEIGHT: 67 IN | RESPIRATION RATE: 18 BRPM | HEART RATE: 80 BPM

## 2021-02-03 DIAGNOSIS — R51.9 HEADACHE, UNSPECIFIED: ICD-10-CM

## 2021-02-03 DIAGNOSIS — Z20.822 CONTACT WITH AND (SUSPECTED) EXPOSURE TO COVID-19: ICD-10-CM

## 2021-02-03 LAB — SARS-COV-2 RNA SPEC QL NAA+PROBE: SIGNIFICANT CHANGE UP

## 2021-02-03 PROCEDURE — U0005: CPT

## 2021-02-03 PROCEDURE — 99283 EMERGENCY DEPT VISIT LOW MDM: CPT

## 2021-02-03 PROCEDURE — U0003: CPT

## 2021-02-03 PROCEDURE — 99284 EMERGENCY DEPT VISIT MOD MDM: CPT

## 2021-02-03 NOTE — ED PROVIDER NOTE - CLINICAL SUMMARY MEDICAL DECISION MAKING FREE TEXT BOX
Patient is presenting to the Emergency Department requesting a COVID-19 test.  Patient has an unremarkable focused exam and looks well. Neuro intact. No neck pain. Advised to cont. hydration and follow-up with PMD and EHS. Nasopharyngeal PCR has been obtained and patient has been guided on how to obtain their results.  General COVID-19 discharge instructions have been given to the patient.

## 2021-02-03 NOTE — ED PROVIDER NOTE - OBJECTIVE STATEMENT
Patient presenting to the Emergency Department requesting COVID-19 testing due to concern for exposure.  Pt reports of having mild HA, self-medicated with advil with improvement of symptoms. Denies the following symptoms: fever, chills, cough, sore throat, shortness of breath, chest pain or bodyaches.

## 2021-02-03 NOTE — ED PROVIDER NOTE - PATIENT PORTAL LINK FT
You can access the FollowMyHealth Patient Portal offered by [facility] by registering at the following website: [email or web address]. By joining [employer]’s FollowMyHealth portal, you will also be able to view your health information using other applications (apps) compatible with our system.

## 2021-02-17 ENCOUNTER — NON-APPOINTMENT (OUTPATIENT)
Age: 36
End: 2021-02-17

## 2021-02-26 ENCOUNTER — APPOINTMENT (OUTPATIENT)
Dept: OTOLARYNGOLOGY | Facility: CLINIC | Age: 36
End: 2021-02-26
Payer: COMMERCIAL

## 2021-02-26 PROCEDURE — ZZZZZ: CPT

## 2021-03-24 LAB — SARS-COV-2 N GENE NPH QL NAA+PROBE: NOT DETECTED

## 2021-05-10 ENCOUNTER — NON-APPOINTMENT (OUTPATIENT)
Age: 36
End: 2021-05-10

## 2021-05-10 ENCOUNTER — APPOINTMENT (OUTPATIENT)
Dept: GYNECOLOGIC ONCOLOGY | Facility: CLINIC | Age: 36
End: 2021-05-10
Payer: COMMERCIAL

## 2021-05-10 VITALS
HEART RATE: 79 BPM | TEMPERATURE: 97.9 F | DIASTOLIC BLOOD PRESSURE: 80 MMHG | SYSTOLIC BLOOD PRESSURE: 124 MMHG | BODY MASS INDEX: 28.12 KG/M2 | OXYGEN SATURATION: 97 % | HEIGHT: 66 IN | WEIGHT: 175 LBS

## 2021-05-10 DIAGNOSIS — D25.0 SUBMUCOUS LEIOMYOMA OF UTERUS: ICD-10-CM

## 2021-05-10 DIAGNOSIS — N96 RECURRENT PREGNANCY LOSS: ICD-10-CM

## 2021-05-10 DIAGNOSIS — D25.2 SUBMUCOUS LEIOMYOMA OF UTERUS: ICD-10-CM

## 2021-05-10 PROCEDURE — 99204 OFFICE O/P NEW MOD 45 MIN: CPT

## 2021-05-10 PROCEDURE — 99072 ADDL SUPL MATRL&STAF TM PHE: CPT

## 2021-05-10 NOTE — OB HISTORY
[Total Preg ___] : : [unfilled] [Full Term ___] : [unfilled] (full-term) [Living ___] : [unfilled] (living) [Vaginal ___] : [unfilled] vaginal delivery(s) [AB Spont ___] : [unfilled] miscarriage(s)

## 2021-05-11 NOTE — HISTORY OF PRESENT ILLNESS
[FreeTextEntry1] : Problem\par 1) Fibroid\par \par Previous Therapy\par 1) Pelvic US 3/15/21\par   a) uterus 5.6cm, dominant 3.4cm fibroid which was a small submucous component deviating the fundal portion of the endometrium posteriorly. \par \par \par 35 yo referred by Dr. Figueroa for surgical evaluation due to finding of fibroid on sonogram.  Patient reports feeling occasional discomfort.  2 years ago she had  previable vaginal delivery at 24 weeks that patient was told was possibly secondary to chorioamnionitis.  She denies abnormal uterine bleeding but has pain during intercourse.  Patient is otherwise asymptomatic of the fibroids.  \par \par OBGYNHx:  -- h/o 6 consecutive first trimester losses, one second trimester loss at 24 weeks, and 1 term .  Follows annually with Dr. Figueroa; no h/o abnormal pap smears.  Last pap smear 2021.  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

## 2021-05-11 NOTE — DISCUSSION/SUMMARY
[Reviewed Clinical Lab Test(s)] : Results of clinical tests were reviewed. [Reviewed Radiology Report(s)] : Radiology reports were reviewed. [Discuss Alternatives/Risks/Benefits w/Patient] : All alternatives, risks, and benefits were discussed with the patient/family and all questions were answered.  Patient expressed good understanding and appreciates the importance of follow up as recommended. [FreeTextEntry1] : 37yo with h/o multiple pregnancy losses presenting for evaluation of fibroid uterus.\par \par This lesion may have caused her chorioamnionitis, however I am not certain.  She would need an open myomectomy to remove the mass and she doesn't think she wants to have another pregnancy.  In this case, she is otherwise not symptomatic so I would consider holding off on treatment.\par \par [ ] patient had MRI pelvis last week -- to upload images at [facility], then to discuss conservative vs. surgical management

## 2021-05-11 NOTE — PHYSICAL EXAM
[Normal] : Recto-Vaginal Exam: Normal [Abnormal] : Uterus: Abnormal [de-identified] : slightly enlarged.  non tender [Fully active, able to carry on all pre-disease performance without restriction] : Status 0 - Fully active, able to carry on all pre-disease performance without restriction

## 2021-05-18 PROBLEM — D25.0 SUBMUCOUS AND SUBSEROUS LEIOMYOMA OF UTERUS: Status: ACTIVE | Noted: 2021-05-18

## 2021-05-18 PROBLEM — N96 HISTORY OF MULTIPLE MISCARRIAGES: Status: ACTIVE | Noted: 2021-01-21

## 2021-07-28 ENCOUNTER — LABORATORY RESULT (OUTPATIENT)
Age: 36
End: 2021-07-28

## 2021-08-25 ENCOUNTER — EMERGENCY (EMERGENCY)
Facility: HOSPITAL | Age: 36
LOS: 1 days | Discharge: HOME | End: 2021-08-25
Attending: EMERGENCY MEDICINE | Admitting: EMERGENCY MEDICINE
Payer: COMMERCIAL

## 2021-08-25 VITALS
OXYGEN SATURATION: 98 % | RESPIRATION RATE: 18 BRPM | TEMPERATURE: 99 F | HEART RATE: 82 BPM | SYSTOLIC BLOOD PRESSURE: 113 MMHG | DIASTOLIC BLOOD PRESSURE: 75 MMHG | HEIGHT: 67 IN | WEIGHT: 169.98 LBS

## 2021-08-25 VITALS
OXYGEN SATURATION: 99 % | HEART RATE: 72 BPM | TEMPERATURE: 98 F | SYSTOLIC BLOOD PRESSURE: 106 MMHG | DIASTOLIC BLOOD PRESSURE: 71 MMHG | RESPIRATION RATE: 18 BRPM

## 2021-08-25 DIAGNOSIS — Z79.3 LONG TERM (CURRENT) USE OF HORMONAL CONTRACEPTIVES: ICD-10-CM

## 2021-08-25 DIAGNOSIS — Z20.822 CONTACT WITH AND (SUSPECTED) EXPOSURE TO COVID-19: ICD-10-CM

## 2021-08-25 DIAGNOSIS — N39.0 URINARY TRACT INFECTION, SITE NOT SPECIFIED: ICD-10-CM

## 2021-08-25 DIAGNOSIS — R42 DIZZINESS AND GIDDINESS: ICD-10-CM

## 2021-08-25 DIAGNOSIS — D64.9 ANEMIA, UNSPECIFIED: ICD-10-CM

## 2021-08-25 DIAGNOSIS — K29.70 GASTRITIS, UNSPECIFIED, WITHOUT BLEEDING: ICD-10-CM

## 2021-08-25 DIAGNOSIS — R07.89 OTHER CHEST PAIN: ICD-10-CM

## 2021-08-25 DIAGNOSIS — R10.13 EPIGASTRIC PAIN: ICD-10-CM

## 2021-08-25 LAB
ALBUMIN SERPL ELPH-MCNC: 4.1 G/DL — SIGNIFICANT CHANGE UP (ref 3.3–5)
ALP SERPL-CCNC: 65 U/L — SIGNIFICANT CHANGE UP (ref 40–120)
ALT FLD-CCNC: 16 U/L — SIGNIFICANT CHANGE UP (ref 10–45)
ANION GAP SERPL CALC-SCNC: 9 MMOL/L — SIGNIFICANT CHANGE UP (ref 5–17)
APPEARANCE UR: CLEAR — SIGNIFICANT CHANGE UP
APTT BLD: 29.3 SEC — SIGNIFICANT CHANGE UP (ref 27.5–35.5)
AST SERPL-CCNC: 15 U/L — SIGNIFICANT CHANGE UP (ref 10–40)
BASOPHILS # BLD AUTO: 0.03 K/UL — SIGNIFICANT CHANGE UP (ref 0–0.2)
BASOPHILS NFR BLD AUTO: 0.4 % — SIGNIFICANT CHANGE UP (ref 0–2)
BILIRUB SERPL-MCNC: 0.2 MG/DL — SIGNIFICANT CHANGE UP (ref 0.2–1.2)
BILIRUB UR-MCNC: NEGATIVE — SIGNIFICANT CHANGE UP
BUN SERPL-MCNC: 8 MG/DL — SIGNIFICANT CHANGE UP (ref 7–23)
CALCIUM SERPL-MCNC: 9.4 MG/DL — SIGNIFICANT CHANGE UP (ref 8.4–10.5)
CHLORIDE SERPL-SCNC: 109 MMOL/L — HIGH (ref 96–108)
CO2 SERPL-SCNC: 25 MMOL/L — SIGNIFICANT CHANGE UP (ref 22–31)
COLOR SPEC: YELLOW — SIGNIFICANT CHANGE UP
CREAT SERPL-MCNC: 0.92 MG/DL — SIGNIFICANT CHANGE UP (ref 0.5–1.3)
D DIMER BLD IA.RAPID-MCNC: <150 NG/ML DDU — SIGNIFICANT CHANGE UP
DIFF PNL FLD: NEGATIVE — SIGNIFICANT CHANGE UP
EOSINOPHIL # BLD AUTO: 0.18 K/UL — SIGNIFICANT CHANGE UP (ref 0–0.5)
EOSINOPHIL NFR BLD AUTO: 2.4 % — SIGNIFICANT CHANGE UP (ref 0–6)
GLUCOSE SERPL-MCNC: 125 MG/DL — HIGH (ref 70–99)
GLUCOSE UR QL: NEGATIVE — SIGNIFICANT CHANGE UP
HCG SERPL-ACNC: <0 MIU/ML — SIGNIFICANT CHANGE UP
HCG UR QL: NEGATIVE — SIGNIFICANT CHANGE UP
HCT VFR BLD CALC: 35.4 % — SIGNIFICANT CHANGE UP (ref 34.5–45)
HGB BLD-MCNC: 11.1 G/DL — LOW (ref 11.5–15.5)
IMM GRANULOCYTES NFR BLD AUTO: 0.3 % — SIGNIFICANT CHANGE UP (ref 0–1.5)
INR BLD: 0.97 — SIGNIFICANT CHANGE UP (ref 0.88–1.16)
KETONES UR-MCNC: NEGATIVE — SIGNIFICANT CHANGE UP
LEUKOCYTE ESTERASE UR-ACNC: ABNORMAL
LYMPHOCYTES # BLD AUTO: 2.87 K/UL — SIGNIFICANT CHANGE UP (ref 1–3.3)
LYMPHOCYTES # BLD AUTO: 38.7 % — SIGNIFICANT CHANGE UP (ref 13–44)
MCHC RBC-ENTMCNC: 27.4 PG — SIGNIFICANT CHANGE UP (ref 27–34)
MCHC RBC-ENTMCNC: 31.4 GM/DL — LOW (ref 32–36)
MCV RBC AUTO: 87.4 FL — SIGNIFICANT CHANGE UP (ref 80–100)
MONOCYTES # BLD AUTO: 0.37 K/UL — SIGNIFICANT CHANGE UP (ref 0–0.9)
MONOCYTES NFR BLD AUTO: 5 % — SIGNIFICANT CHANGE UP (ref 2–14)
NEUTROPHILS # BLD AUTO: 3.95 K/UL — SIGNIFICANT CHANGE UP (ref 1.8–7.4)
NEUTROPHILS NFR BLD AUTO: 53.2 % — SIGNIFICANT CHANGE UP (ref 43–77)
NITRITE UR-MCNC: NEGATIVE — SIGNIFICANT CHANGE UP
NRBC # BLD: 0 /100 WBCS — SIGNIFICANT CHANGE UP (ref 0–0)
NT-PROBNP SERPL-SCNC: 19 PG/ML — SIGNIFICANT CHANGE UP (ref 0–300)
PH UR: 7 — SIGNIFICANT CHANGE UP (ref 5–8)
PLATELET # BLD AUTO: 236 K/UL — SIGNIFICANT CHANGE UP (ref 150–400)
POTASSIUM SERPL-MCNC: 4 MMOL/L — SIGNIFICANT CHANGE UP (ref 3.5–5.3)
POTASSIUM SERPL-SCNC: 4 MMOL/L — SIGNIFICANT CHANGE UP (ref 3.5–5.3)
PROT SERPL-MCNC: 6.9 G/DL — SIGNIFICANT CHANGE UP (ref 6–8.3)
PROT UR-MCNC: NEGATIVE MG/DL — SIGNIFICANT CHANGE UP
PROTHROM AB SERPL-ACNC: 11.6 SEC — SIGNIFICANT CHANGE UP (ref 10.6–13.6)
RBC # BLD: 4.05 M/UL — SIGNIFICANT CHANGE UP (ref 3.8–5.2)
RBC # FLD: 13 % — SIGNIFICANT CHANGE UP (ref 10.3–14.5)
SARS-COV-2 RNA SPEC QL NAA+PROBE: SIGNIFICANT CHANGE UP
SODIUM SERPL-SCNC: 143 MMOL/L — SIGNIFICANT CHANGE UP (ref 135–145)
SP GR SPEC: 1.01 — SIGNIFICANT CHANGE UP (ref 1–1.03)
TROPONIN T SERPL-MCNC: 0.01 NG/ML — SIGNIFICANT CHANGE UP (ref 0–0.01)
UROBILINOGEN FLD QL: 0.2 E.U./DL — SIGNIFICANT CHANGE UP
WBC # BLD: 7.42 K/UL — SIGNIFICANT CHANGE UP (ref 3.8–10.5)
WBC # FLD AUTO: 7.42 K/UL — SIGNIFICANT CHANGE UP (ref 3.8–10.5)

## 2021-08-25 PROCEDURE — 93005 ELECTROCARDIOGRAM TRACING: CPT

## 2021-08-25 PROCEDURE — 93010 ELECTROCARDIOGRAM REPORT: CPT

## 2021-08-25 PROCEDURE — 99285 EMERGENCY DEPT VISIT HI MDM: CPT

## 2021-08-25 PROCEDURE — 81001 URINALYSIS AUTO W/SCOPE: CPT

## 2021-08-25 PROCEDURE — 85025 COMPLETE CBC W/AUTO DIFF WBC: CPT

## 2021-08-25 PROCEDURE — 81025 URINE PREGNANCY TEST: CPT

## 2021-08-25 PROCEDURE — 85610 PROTHROMBIN TIME: CPT

## 2021-08-25 PROCEDURE — 85379 FIBRIN DEGRADATION QUANT: CPT

## 2021-08-25 PROCEDURE — U0003: CPT

## 2021-08-25 PROCEDURE — 84484 ASSAY OF TROPONIN QUANT: CPT

## 2021-08-25 PROCEDURE — 80053 COMPREHEN METABOLIC PANEL: CPT

## 2021-08-25 PROCEDURE — 84702 CHORIONIC GONADOTROPIN TEST: CPT

## 2021-08-25 PROCEDURE — 85730 THROMBOPLASTIN TIME PARTIAL: CPT

## 2021-08-25 PROCEDURE — 36415 COLL VENOUS BLD VENIPUNCTURE: CPT

## 2021-08-25 PROCEDURE — 83880 ASSAY OF NATRIURETIC PEPTIDE: CPT

## 2021-08-25 PROCEDURE — U0005: CPT

## 2021-08-25 PROCEDURE — 71045 X-RAY EXAM CHEST 1 VIEW: CPT | Mod: 26

## 2021-08-25 PROCEDURE — 71045 X-RAY EXAM CHEST 1 VIEW: CPT

## 2021-08-25 PROCEDURE — 96374 THER/PROPH/DIAG INJ IV PUSH: CPT

## 2021-08-25 PROCEDURE — 99284 EMERGENCY DEPT VISIT MOD MDM: CPT | Mod: 25

## 2021-08-25 RX ORDER — SODIUM CHLORIDE 9 MG/ML
1000 INJECTION INTRAMUSCULAR; INTRAVENOUS; SUBCUTANEOUS ONCE
Refills: 0 | Status: COMPLETED | OUTPATIENT
Start: 2021-08-25 | End: 2021-08-25

## 2021-08-25 RX ORDER — DROSPIRENONE AND ETHINYL ESTRADIOL 0.03MG-3MG
1 KIT ORAL
Qty: 0 | Refills: 0 | DISCHARGE

## 2021-08-25 RX ORDER — CEFTRIAXONE 500 MG/1
1000 INJECTION, POWDER, FOR SOLUTION INTRAMUSCULAR; INTRAVENOUS ONCE
Refills: 0 | Status: COMPLETED | OUTPATIENT
Start: 2021-08-25 | End: 2021-08-25

## 2021-08-25 RX ORDER — CEFPODOXIME PROXETIL 100 MG
1 TABLET ORAL
Qty: 14 | Refills: 0
Start: 2021-08-25 | End: 2021-08-31

## 2021-08-25 RX ADMIN — SODIUM CHLORIDE 1000 MILLILITER(S): 9 INJECTION INTRAMUSCULAR; INTRAVENOUS; SUBCUTANEOUS at 18:39

## 2021-08-25 RX ADMIN — CEFTRIAXONE 100 MILLIGRAM(S): 500 INJECTION, POWDER, FOR SOLUTION INTRAMUSCULAR; INTRAVENOUS at 18:39

## 2021-08-25 NOTE — ED PROVIDER NOTE - INTERNATIONAL TRAVEL
No douching/No intercourse/No heavy lifting/Nothing per rectum/No tampons/Nothing per vagina/No tub baths No

## 2021-08-25 NOTE — ED PROVIDER NOTE - NSFOLLOWUPINSTRUCTIONS_ED_ALL_ED_FT
Please follow up with your primary care physician. Please take antibiotics as directed. Please follow a healthy diet and continue to take your omeprazole for your stomach. If you have any problem getting an appointment this week, please call the ED Referral Coordinator at [phone].  Return to the Emergency Department if you have any new or worsening symptoms, or for any other concerns. Please read below for further information.    Urinary Tract Infection    A urinary tract infection (UTI) is an infection of any part of the urinary tract, which includes the kidneys, ureters, bladder, and urethra. Risk factors include ignoring your need to urinate, wiping back to front if female, being an uncircumcised male, and having diabetes or a weak immune system. Symptoms include frequent urination, pain or burning with urination, foul smelling urine, cloudy urine, pain in the lower abdomen, blood in the urine, and fever. If you were prescribed an antibiotic medicine, take it as told by your health care provider. Do not stop taking the antibiotic even if you start to feel better.    SEEK IMMEDIATE MEDICAL CARE IF YOU HAVE ANY OF THE FOLLOWING SYMPTOMS: severe back or abdominal pain, fever, inability to keep fluids or medicine down, dizziness/lightheadedness, or a change in mental status.    Gastritis    Gastritis is soreness and swelling (inflammation) of the lining of the stomach. Gastritis can develop as a sudden onset (acute) or long-term (chronic) condition. If gastritis is not treated, it can lead to stomach bleeding and ulcers. Causes include viral and bacterial infections, excessive alcohol consumption, tobacco use, or certain medications. Symptoms include nausea, vomiting, or abdominal pain or burning especially after eating. Avoid foods or drinks that make your symptoms worse such as caffeine, chocolate, spicy foods, acidic foods, or alcohol.    SEEK IMMEDIATE MEDICAL CARE IF YOU HAVE ANY OF THE FOLLOWING SYMPTOMS: black or bloody stools, blood or coffee-ground-colored vomitus, worsening abdominal pain, fever, or inability to keep fluids down.

## 2021-08-25 NOTE — ED ADULT NURSE NOTE - NURSING NEURO LEVEL OF CONSCIOUSNESS
CC:  Megan Ware is here today for Fever   Patient has been exposed to strep over the weekend. Today woke up with fever. Tylenol at 7:30 AM. Patient not feeling well.    Medications: medications verified, no change  Refills needed today?  NO  denies Latex allergy or sensitivity  Patient would like communication of their results via:      Cell Phone:   Telephone Information:   Mobile [phone]     Okay to leave a message containing results? Yes  Tobacco history: not verified  Advanced directives: Not Applicable  Patient's current [facility] status: Inactive - Patient declined.  There are no preventive care reminders to display for this patient.  Patient is up to date, no discussion needed.                   alert and awake

## 2021-08-25 NOTE — ED PROVIDER NOTE - PATIENT PORTAL LINK FT
You can access the FollowMyHealth Patient Portal offered by [facility] by registering at the following website: [email or web address]. By joining [employer]’s FollowMyHealth portal, you will also be able to view your health information using other applications (apps) compatible with our system.

## 2021-08-25 NOTE — ED PROVIDER NOTE - CLINICAL SUMMARY MEDICAL DECISION MAKING FREE TEXT BOX
36F with hx of cholelithiasis, on ocps, who p/w epigastric, abdominal, and chest discomfort since last night, felt lightheaded with some b/l back pain and arm weakness when she was leaving work so she decided top come to the ER. Also reports burning on urination x 1-2 days. No f/c, no n/v, no dizziness or syncope, no n/t/w in ext. Pt denies pain in her RUQ, No other complaints.    On exam, pt very well-appearing, non surgical abd, neg murphys/mcburneys, CXR neg (pt refused to take bra off), labs with no emergent findings. UA shows infection. Suspect UTI and gastritis cause of symptoms. Will treat with IVFs and ABx.   ON reassessment, the patient is stable for DC and is feeling much better.  Symptoms have improved and after discussion regarding discharge, patient feels comfortable going home.  Answers to all questions provided.  Patient feeling satisfactory with care and follow up plan discussed. They were advised to call their PMD for prompt outpatient follow up. Return precautions were discussed. The patient was advised to return to the ER for any concerning or worsening symptoms.

## 2021-08-25 NOTE — ED ADULT NURSE NOTE - CHIEF COMPLAINT QUOTE
Patient /o of chest discomfort w/ SOB started last night, w/ lightheadedness and headache, today w/ bilateral arm weakness, states had just changed to a new birth control medication.  EKG in progress. oral

## 2021-08-25 NOTE — ED ADULT NURSE NOTE - OBJECTIVE STATEMENT
Patient arrives ambulatory reporting mid chest pain/ SOB on exertion/ enrike arm tingling/ tingling at tip of toes/ burning with urination.  STAT EKG completed at time of triage.  No s:s of respiratory distress.  Patient reports recently starting a new birth control pill.

## 2021-08-25 NOTE — ED PROVIDER NOTE - PHYSICAL EXAMINATION
GEN: Well appearing, well developed, awake, alert, oriented to person, place, time/situation and in no apparent distress. NTAF  ENT: Airway patent, Nasal mucosa clear. Mouth with normal mucosa.  EYES: Clear bilaterally. PERRL, EOMI  RESPIRATORY: Breathing comfortably with normal RR. No W/C/R, no hypoxia or resp distress.  CARDIAC: Regular rate and rhythm, no M/R/G  ABDOMEN: Soft, mild epigastric discomfort with palpation, otherwise nontender, +bowel sounds, no rebound, rigidity, or guarding. No CVAT b/l. Neg Chun's, neg McBurney's,  MSK: Range of motion is not limited, no deformities noted.  NEURO: Alert and oriented, no focal deficits.  SKIN: Skin normal color for race, warm, dry and intact. No evidence of rash.  PSYCH: Alert and oriented to person, place, time/situation. normal mood and affect. no apparent risk to self or others.

## 2021-08-25 NOTE — ED PROVIDER NOTE - OBJECTIVE STATEMENT
36F with hx of gastritis, cholelithiasis, on ocps, who p/w epigastric, abdominal, and chest discomfort since last night, felt lightheaded with some b/l back pain and arm weakness when she was leaving work so she decided top come to the ER. Also reports burning on urination x 1-2 days. No f/c, no n/v, no dizziness or syncope, no n/t/w in ext. Pt denies pain in her RUQ, No other complaints.

## 2021-08-25 NOTE — ED ADULT TRIAGE NOTE - CHIEF COMPLAINT QUOTE
Patient /o of chest discomfort w/ SOB started last night, w/ lightheadedness and headache, today w/ bilateral arm weakness, states had just changed to a new birth control medication.  EKG in progress.

## 2021-09-27 NOTE — ED PROVIDER NOTE - NSCAREINITIATED _GEN_ER
Regimen: Remicade    Dr Jaime Leon is supervising provider today.    Nursing Assessment: A focused nursing assessment  was performed and the patient reports the following: Vomiting: NO  Fever: NO  Chills: NO  Other signs of infection: NO  Diarrhea: YES  Fatigue/Weakness: NO  Pain: NO    Pre-Treatment: - Patient has valid pre-authorization  - VS completed  - Height and weight verified  - Premed orders are verified prior to administration  - Treatment parameters verified in patient protocol  - Lab results checked - MD notified; Pt declined Pregnancy test  - Patient is identified by first & last name, Date of birth that has been verified with the patient chairside.  Pt declines Pregnancy test    Treatment: Refer to [facility] and MAR for line assessment and medication administration, Blood return confirmed before, during and after treatment administered, Infusion pump used for non-vesicant drugs and SubQ/IM Injection: See injection record for documentation    Post Treatment: Treatment tolerated well; no adverse reaction    Education: Instructed on side effects of Remicade    Next appointment scheduled:   Future Appointments   Date Time Provider Department Center   9/28/2021 10:40 AM Liana Ford MD [id number]   9/29/2021 10:30 AM Clover Raymond, OT [id number] [facility]   10/1/2021  9:45 AM Clover Raymond, OT [id number] [facility]   10/11/2021 10:30 AM Clover Raymond OT [id number] [facility]   10/14/2021 10:45 AM Clover Raymond, OT [id number] [facility]   10/25/2021  1:00 PM ADT ACL LAB ACLDOW Kitty Downt   11/8/2021 11:00 AM MD ANDREW Nguyen   12/22/2021 10:30 AM NAVID Stoddard     Patient instructed to call the office with any questions or concerns.    Patient Discharged: patient discharged to home per self, ambulatory    
Linda Garvin(Attending)

## 2021-11-05 PROBLEM — Z87.09 HISTORY OF ACUTE PHARYNGITIS: Status: RESOLVED | Noted: 2021-04-13 | Resolved: 2021-11-05

## 2021-11-05 PROBLEM — Z01.818 PRE-OP TESTING: Status: RESOLVED | Noted: 2021-02-26 | Resolved: 2021-11-05

## 2021-11-05 PROBLEM — U07.1 COVID-19 VIRUS DETECTED: Status: ACTIVE | Noted: 2020-04-05

## 2021-11-10 ENCOUNTER — NON-APPOINTMENT (OUTPATIENT)
Age: 36
End: 2021-11-10

## 2021-11-10 ENCOUNTER — APPOINTMENT (OUTPATIENT)
Dept: HEART AND VASCULAR | Facility: CLINIC | Age: 36
End: 2021-11-10
Payer: COMMERCIAL

## 2021-11-10 VITALS
DIASTOLIC BLOOD PRESSURE: 77 MMHG | HEIGHT: 66 IN | SYSTOLIC BLOOD PRESSURE: 111 MMHG | BODY MASS INDEX: 26.52 KG/M2 | HEART RATE: 89 BPM | WEIGHT: 165 LBS | OXYGEN SATURATION: 99 %

## 2021-11-10 DIAGNOSIS — Z01.818 ENCOUNTER FOR OTHER PREPROCEDURAL EXAMINATION: ICD-10-CM

## 2021-11-10 DIAGNOSIS — Z87.09 PERSONAL HISTORY OF OTHER DISEASES OF THE RESPIRATORY SYSTEM: ICD-10-CM

## 2021-11-10 DIAGNOSIS — R25.3 FASCICULATION: ICD-10-CM

## 2021-11-10 DIAGNOSIS — I30.0 ACUTE NONSPECIFIC IDIOPATHIC PERICARDITIS: ICD-10-CM

## 2021-11-10 DIAGNOSIS — Z87.59 PERSONAL HISTORY OF OTHER COMPLICATIONS OF PREGNANCY, CHILDBIRTH AND THE PUERPERIUM: ICD-10-CM

## 2021-11-10 DIAGNOSIS — U07.1 COVID-19: ICD-10-CM

## 2021-11-10 DIAGNOSIS — S63.591A OTHER SPECIFIED SPRAIN OF RIGHT WRIST, INITIAL ENCOUNTER: ICD-10-CM

## 2021-11-10 DIAGNOSIS — I30.9 ACUTE PERICARDITIS, UNSPECIFIED: ICD-10-CM

## 2021-11-10 PROCEDURE — 93000 ELECTROCARDIOGRAM COMPLETE: CPT

## 2021-11-10 PROCEDURE — 99214 OFFICE O/P EST MOD 30 MIN: CPT | Mod: 25

## 2021-11-10 NOTE — REASON FOR VISIT
[Other: ____] : [unfilled] [FreeTextEntry1] : Diagnostic Tests:\par -----------------------------------------\par ECG:\par 11/10/21: sinus rhythm, RI and ST changes consistent with pericarditis. \par 02/12/20: sinus rhythm, RI and ST changes consistent with pericarditis. \par ______________________________\par Echo:\par 10/16/19: EF 62%, mild pericardial effusion.

## 2021-11-10 NOTE — ASSESSMENT
[FreeTextEntry1] : 1. Presumptive recurrent pericarditis:  \par       - will check lab work including ESR and CRP \par       - will send for an echocardiogram to r/o pericardial effusion\par \par 2. Spontaneous abortions (7 total with some late stage): s/p negative work up for hypercoagulable state\par       - will manage conservatively at this time \par \par 3. Muscle fasciculations:\par       - check electrolytes today (magnesium, calcium, potassium) \par \par \par An ECG was obtained to evaluate heart rhythm and screen for any underlying cardiac abnormalities. \par \par The patient was seen and examined with a cardiology fellow as part of their longitudinal ambulatory cardiology training experience.  Permission was obtained at the time of the visit from the patient for the fellow's participation. \par \par \par \par \par \par

## 2021-11-10 NOTE — HISTORY OF PRESENT ILLNESS
[FreeTextEntry1] : Eve Gibbs presents for follow up and management of pericarditis 2019 and COVID-19 (04/2020).  She was diagnosed with COVID-19 in April, 2020 and was briefly admitted to [facility] at that time. Of note, she has a history of 7 prior miscarriages and underwent a complete hematologic evaluation for hypercoagulable state which was normal.  She had complaints of two episodes of chest pain last week lasting minutes at a time.  The symptoms were nonexertional, worse with inspiration, nonradiating, and resolved spontaneously.  In addition, she has complaints of muscle fasciculations of her eyelid and perioral muscles.  \par

## 2021-11-12 LAB
ALBUMIN SERPL ELPH-MCNC: 4.4 G/DL
ALP BLD-CCNC: 75 U/L
ALT SERPL-CCNC: 5 U/L
ANION GAP SERPL CALC-SCNC: 15 MMOL/L
AST SERPL-CCNC: 13 U/L
BASOPHILS # BLD AUTO: 0.01 K/UL
BASOPHILS NFR BLD AUTO: 0.2 %
BILIRUB SERPL-MCNC: 0.3 MG/DL
BUN SERPL-MCNC: 13 MG/DL
CA-I SERPL-SCNC: 1.32 MMOL/L
CALCIUM SERPL-MCNC: 10 MG/DL
CHLORIDE SERPL-SCNC: 107 MMOL/L
CHOLEST SERPL-MCNC: 161 MG/DL
CO2 SERPL-SCNC: 22 MMOL/L
CREAT SERPL-MCNC: 0.85 MG/DL
CRP SERPL-MCNC: <3 MG/L
EOSINOPHIL # BLD AUTO: 0.12 K/UL
EOSINOPHIL NFR BLD AUTO: 2.2 %
ERYTHROCYTE [SEDIMENTATION RATE] IN BLOOD BY WESTERGREN METHOD: 31 MM/HR
ESTIMATED AVERAGE GLUCOSE: 114 MG/DL
GLUCOSE SERPL-MCNC: 75 MG/DL
HBA1C MFR BLD HPLC: 5.6 %
HCT VFR BLD CALC: 41.9 %
HDLC SERPL-MCNC: 74 MG/DL
HGB BLD-MCNC: 12.6 G/DL
IMM GRANULOCYTES NFR BLD AUTO: 0.2 %
LDLC SERPL CALC-MCNC: 68 MG/DL
LDLC SERPL DIRECT ASSAY-MCNC: 72 MG/DL
LYMPHOCYTES # BLD AUTO: 2.44 K/UL
LYMPHOCYTES NFR BLD AUTO: 44.4 %
MAGNESIUM SERPL-MCNC: 2 MG/DL
MAN DIFF?: NORMAL
MCHC RBC-ENTMCNC: 28 PG
MCHC RBC-ENTMCNC: 30.1 GM/DL
MCV RBC AUTO: 93.1 FL
MONOCYTES # BLD AUTO: 0.28 K/UL
MONOCYTES NFR BLD AUTO: 5.1 %
NEUTROPHILS # BLD AUTO: 2.64 K/UL
NEUTROPHILS NFR BLD AUTO: 47.9 %
NONHDLC SERPL-MCNC: 87 MG/DL
PLATELET # BLD AUTO: 285 K/UL
POTASSIUM SERPL-SCNC: 4.7 MMOL/L
PROT SERPL-MCNC: 6.9 G/DL
RBC # BLD: 4.5 M/UL
RBC # FLD: 13.8 %
SODIUM SERPL-SCNC: 143 MMOL/L
TRIGL SERPL-MCNC: 97 MG/DL
TSH SERPL-ACNC: 1.3 UIU/ML
WBC # FLD AUTO: 5.5 K/UL

## 2021-11-28 ENCOUNTER — FORM ENCOUNTER (OUTPATIENT)
Age: 36
End: 2021-11-28

## 2021-11-29 ENCOUNTER — OUTPATIENT (OUTPATIENT)
Dept: OUTPATIENT SERVICES | Facility: HOSPITAL | Age: 36
LOS: 1 days | End: 2021-11-29
Payer: COMMERCIAL

## 2021-11-29 DIAGNOSIS — I30.9 ACUTE PERICARDITIS, UNSPECIFIED: ICD-10-CM

## 2021-11-29 PROCEDURE — 93306 TTE W/DOPPLER COMPLETE: CPT

## 2021-11-29 PROCEDURE — 93306 TTE W/DOPPLER COMPLETE: CPT | Mod: 26

## 2021-11-30 ENCOUNTER — NON-APPOINTMENT (OUTPATIENT)
Age: 36
End: 2021-11-30

## 2022-02-09 NOTE — HISTORY OF PRESENT ILLNESS
[FreeTextEntry1] : Eve Gbibs presents for follow up and management of multiple spontaneous abortions, pericarditis , and COVID-19 (2020, 2022).  She was diagnosed with COVID-19 in  and was briefly admitted to [facility] at that time. Of note, she has a history of 8 prior miscarriages and underwent a complete hematologic evaluation for hypercoagulable state which was normal.  She had complaints of two episodes of chest pain last week lasting minutes at a time.  The symptoms were nonexertional, worse with inspiration, nonradiating, and resolved spontaneously.  In addition, she has complaints of muscle fasciculations of her eyelid and perioral muscles.  On 21 she had an echocardiogram which revealed an EF of 64% and a small pericardial effusion.  I saw her mother (Kiara William) as a patient on 22 and she accompanied her to the visit.  During that visit, she informed me of her 8th spontaneous  at 7 weeks gestation which resulted to large uterine blood loss.   \par

## 2022-02-09 NOTE — REASON FOR VISIT
[Other: ____] : [unfilled] [FreeTextEntry1] : Diagnostic Tests:\par -----------------------------------------\par ECG:\par 11/10/21: sinus rhythm, TN and ST changes consistent with pericarditis. \par 02/12/20: sinus rhythm, TN and ST changes consistent with pericarditis. \par ______________________________\par Echo:\par 11/29/21: EF 64%, small pericardial effusion. \par 10/16/19: EF 62%, small pericardial effusion.

## 2022-02-10 ENCOUNTER — APPOINTMENT (OUTPATIENT)
Dept: INTERNAL MEDICINE | Facility: CLINIC | Age: 37
End: 2022-02-10

## 2022-04-11 PROBLEM — Z11.59 SCREENING FOR VIRAL DISEASE: Status: RESOLVED | Noted: 2020-10-14 | Resolved: 2020-12-02

## 2023-07-15 NOTE — LACTATION INITIAL EVALUATION - DELIVERY MODE
breast
35yo female  @ 29.3 wks SLIUP uncomp PNC here complaining of decreased FM. Pt reports that she also had covid 3 weeks ago and since then she has had a dry scratchy throat and a slight cough and is having difficulty sleeping as a result. Pt denies VB/LOF/ctx's/ cramping.    Pmhx- GERD  Pshx/Hosp- D&C x 2  Meds- PNV, pantoprazole, famotidine, bASA  NKDA; Allergic to shell fish  Past ob- SAB x 3 with D&C x 2  Gyn- denies  Soc- denies

## 2024-09-06 NOTE — LACTATION INITIAL EVALUATION - NS LACT CON SNS
Problem:      Left wrist pain [M25.532]     Pain, swelling at distal radius. Limited ROM. Increasing pain.  XR without acute fracture.       Is Patient currently treated by:  ?    Referred by:  Valerie Sheppard ER:  JACKIE SWAIN Date:  9/5/2024    Date of Injury:  x6 weeks Type:  ?    Work Related: no    Previous X-Rays:  [facility]    Has patient been made aware to bring films?  no             breast massage/compression/expressed human milk/finger suck

## 2025-04-10 NOTE — ED ADULT NURSE NOTE - NS ED PATIENT SAFETY CONCERN
Received refill request for Carvedilol 6.25 MG Oral Tablet   Request is denied     Writer called patient to obtain clarification on the dose. She is taking 1.5 tabs twice daily.  She also stated that she does not need this filled at this time.  Refill denied        No

## 2025-07-07 NOTE — DISCHARGE NOTE OB - FOUL SMELLING VAGINAL DISCHARGE
[FreeTextEntry1] : Alert. Oriented. No adenopathy. Left breast unremarkable. Mild to moderate residual hyperpigmentation skin right breast, no palpable mass. 
Statement Selected